# Patient Record
Sex: FEMALE | Race: WHITE | NOT HISPANIC OR LATINO | Employment: PART TIME | URBAN - METROPOLITAN AREA
[De-identification: names, ages, dates, MRNs, and addresses within clinical notes are randomized per-mention and may not be internally consistent; named-entity substitution may affect disease eponyms.]

---

## 2018-02-10 ENCOUNTER — HOSPITAL ENCOUNTER (EMERGENCY)
Facility: CLINIC | Age: 23
Discharge: HOME | End: 2018-02-10
Attending: EMERGENCY MEDICINE | Admitting: EMERGENCY MEDICINE

## 2018-02-10 ENCOUNTER — APPOINTMENT (OUTPATIENT)
Dept: GENERAL RADIOLOGY | Facility: CLINIC | Age: 23
End: 2018-02-10
Attending: FAMILY MEDICINE

## 2018-02-10 VITALS
DIASTOLIC BLOOD PRESSURE: 98 MMHG | SYSTOLIC BLOOD PRESSURE: 122 MMHG | TEMPERATURE: 97.6 F | BODY MASS INDEX: 18.6 KG/M2 | HEART RATE: 98 BPM | WEIGHT: 105 LBS | RESPIRATION RATE: 18 BRPM | OXYGEN SATURATION: 98 %

## 2018-02-10 DIAGNOSIS — R10.12 ABDOMINAL PAIN, LEFT UPPER QUADRANT: ICD-10-CM

## 2018-02-10 LAB — HCG UR QL: NEGATIVE

## 2018-02-10 PROCEDURE — 74018 RADEX ABDOMEN 1 VIEW: CPT | Mod: TC

## 2018-02-10 PROCEDURE — 81025 URINE PREGNANCY TEST: CPT | Performed by: NURSE PRACTITIONER

## 2018-02-10 PROCEDURE — 99284 EMERGENCY DEPT VISIT MOD MDM: CPT | Performed by: EMERGENCY MEDICINE

## 2018-02-10 PROCEDURE — 99284 EMERGENCY DEPT VISIT MOD MDM: CPT | Mod: Z6 | Performed by: EMERGENCY MEDICINE

## 2018-02-10 NOTE — ED NOTES
PT has lip metal piercings and lost the piercing about a wk ago and thought that it would pass, but recently just not feeling right.  She has had approx 3 BM's this wk.

## 2018-02-10 NOTE — ED AVS SNAPSHOT
[facility] Emergency Department    [address] [place] [address]    Phone:  [phone]    Fax:  [phone]                                       Jenny Bradford   MRN: [id number]    Department:  [facility] Emergency Department   Date of Visit:  2/10/2018           After Visit Summary Signature Page     I have received my discharge instructions, and my questions have been answered. I have discussed any challenges I see with this plan with the nurse or doctor.    ..........................................................................................................................................  Patient/Patient Representative Signature      ..........................................................................................................................................  Patient Representative Print Name and Relationship to Patient    ..................................................               ................................................  Date                                            Time    ..........................................................................................................................................  Reviewed by Signature/Title    ...................................................              ..............................................  Date                                                            Time

## 2018-02-10 NOTE — ED AVS SNAPSHOT
[facility] Emergency Department    [address] [place] [address]    Phone:  [phone]    Fax:  [phone]                                       Jenny Bradford   MRN: [id number]    Department:  [facility] Emergency Department   Date of Visit:  2/10/2018           Patient Information     Date Of Birth          1995        Your diagnoses for this visit were:     Abdominal pain, left upper quadrant left flank       You were seen by Leslye Castelan MD.      Follow-up Information     Follow up with Clinic, [facility].    Contact information:    [address] [facility]  [place] [address] [phone]          Discharge Instructions       Try to drink lots of fluids.    If you have any worsening or changes return at any time.    I hope you feel MUCH better quickly!!    24 Hour Appointment Hotline       To make an appointment at any [facility], call [phone] ([phone]). If you don't have a family doctor or clinic, we will help you find one. [place] clinics are conveniently located to serve the needs of you and your family.             Review of your medicines      Our records show that you are taking the medicines listed below. If these are incorrect, please call your family doctor or clinic.        Dose / Directions Last dose taken    albuterol 108 (90 BASE) MCG/ACT Inhaler   Commonly known as:  PROAIR HFA/PROVENTIL HFA/VENTOLIN HFA   Dose:  2 puff   Quantity:  1 Inhaler        Inhale 2 puffs into the lungs every 6 hours as needed for shortness of breath / dyspnea or wheezing   Refills:  0        diclofenac 1 % Gel topical gel   Commonly known as:  VOLTAREN   Quantity:  100 g        Apply 4 grams to chest 4 times daily for 10 days   Refills:  2        escitalopram 20 MG tablet   Commonly known as:  LEXAPRO   Quantity:  30 tablet        Take 1/2 tablet (10 mg) for 1-2 weeks, then increase to 1 tablet orally daily   Refills:  0        fluconazole  200 MG tablet   Commonly known as:  DIFLUCAN   Dose:  200 mg        Take 200 mg by mouth   Refills:  0        levonorgestrel 20 MCG/24HR IUD   Commonly known as:  MIRENA   Dose:  1 Device        1 Device by Intrauterine route   Refills:  0        ondansetron 4 MG tablet   Commonly known as:  ZOFRAN   Dose:  4 mg   Quantity:  12 tablet        Take 1 tablet (4 mg) by mouth every 8 hours as needed for nausea   Refills:  0                Procedures and tests performed during your visit     HCG qualitative urine (UPT)    XR Abdomen 1 View      Orders Needing Specimen Collection     None      Pending Results     No orders found from 2/8/2018 to 2/11/2018.            Pending Culture Results     No orders found from 2/8/2018 to 2/11/2018.            Pending Results Instructions     If you had any lab results that were not finalized at the time of your Discharge, you can call the ED Lab Result RN at [phone]. You will be contacted by this team for any positive Lab results or changes in treatment. The nurses are available 7 days a week from 10A to 6:30P.  You can leave a message 24 hours per day and they will return your call.        Thank you for choosing [facility]       Thank you for choosing [facility] for your care. Our goal is always to provide you with excellent care. Hearing back from our patients is one way we can continue to improve our services. Please take a few minutes to complete the written survey that you may receive in the mail after you visit with us. Thank you!        Prescription [employer] Information     [employer] gives you secure access to your electronic health record. If you see a primary care provider, you can also send messages to your care team and make appointments. If you have questions, please call your primary care clinic.  If you do not have a primary care provider, please call [phone] and they will assist you.        Care EveryWhere ID     This is your Care EveryWhere ID. This could be used by other  organizations to access your [facility] medical records  [id number]        Equal Access to Services     RIGOBERTO CORDOVA : Jean Marquez, beau hampton, clifton swan. So [facility] [phone].    ATENCIÓN: Si habla español, tiene a john disposición servicios gratuitos de asistencia lingüística. Llame al [phone].    We comply with applicable federal civil rights laws and [place] laws. We do not discriminate on the basis of race, color, national origin, age, disability, sex, sexual orientation, or gender identity.            After Visit Summary       This is your record. Keep this with you and show to your community pharmacist(s) and doctor(s) at your next visit.

## 2018-02-11 NOTE — ED PROVIDER NOTES
History     Chief Complaint   Patient presents with     Swallowed Foreign Body     The history is provided by the patient and medical records.     This is a 22-year-old female with history of asthma presenting with concerns after swallowing a foreign body.  Patient accidentally swallowed her lip ring that she was chewing on a few days ago.  She has had some intermittent stools over the last week which is not unusual for her.  She tends towards constipation and takes fiber.  Today she had the onset of left upper quadrant/left flank pain a couple hours prior to coming to the ED.  He was concerned that the cause may be from the swallowed foreign body silk she came to the ED.  The pain is a sharp ache and increases with movement or deep breath.  It makes her feel short of breath.  Patient has not had a bowel movement for the last couple of days.  She is still passing gas.  She denies any nausea at this point.  No vaginal discharge.  She states she is on injectable birth control.  No history of kidney stones.  She has had a cough but no fevers or chills.  The cough is basically nonproductive.  She denies any trauma.    Problem List:    Patient Active Problem List    Diagnosis Date Noted     Pelvic floor dysfunction- Sees Orville Damian. 01/05/2016     Priority: Medium     Tobacco use disorder 04/21/2015     Priority: Medium     Depo-Provera contraceptive status 11/17/2014     Priority: Medium     Contraception 11/17/2014     Priority: Medium     Anxiety 09/16/2014     Priority: Medium     Mild major depression (H) 09/16/2014     Priority: Medium     CARDIOVASCULAR SCREENING; LDL GOAL LESS THAN 160 09/04/2014     Priority: Medium        Past Medical History:    Past Medical History:   Diagnosis Date     Asthma        Past Surgical History:    History reviewed. No pertinent surgical history.    Family History:    No family history on file.    Social History:  Marital Status:  Single [1]  Social History    Substance Use Topics     Smoking status: Current Some Day Smoker     Types: Cigarettes     Smokeless tobacco: Never Used     Alcohol use Yes      Comment: rare        Medications:      fluconazole (DIFLUCAN) 200 MG tablet   levonorgestrel (MIRENA) 20 MCG/24HR IUD   ondansetron (ZOFRAN) 4 MG tablet   escitalopram (LEXAPRO) 20 MG tablet   albuterol (PROAIR HFA, PROVENTIL HFA, VENTOLIN HFA) 108 (90 BASE) MCG/ACT inhaler   diclofenac (VOLTAREN) 1 % GEL         Review of Systems   All other ROS reviewed and are negative or non-contributory except as stated in HPI.     Physical Exam   BP: (!) 122/98  Pulse: 98  Temp: 97.6  F (36.4  C)  Resp: 18  Weight: 47.6 kg (105 lb)  SpO2: 98 %      Physical Exam   Constitutional: She is oriented to person, place, and time. She appears well-developed and well-nourished.   Thin, very healthy-appearing female.  Moves easily about in the room.  Initially kissing her boyfriend when I walked in.   HENT:   Head: Normocephalic.   Nose: Nose normal.   Mouth/Throat: Oropharynx is clear and moist.   Eyes: Conjunctivae and EOM are normal. No scleral icterus.   Neck: Normal range of motion. Neck supple.   Cardiovascular: Normal rate, regular rhythm, normal heart sounds and intact distal pulses.    Pulmonary/Chest: Effort normal and breath sounds normal.   No crepitus, bony step-off, bony tenderness along the left lateral ribs.   Abdominal: Soft.   Left upper quadrant tenderness to palpation without mass or rebound.  Mild left CVA tenderness.  No external skin changes.  Very thin   Musculoskeletal: Normal range of motion. She exhibits no edema.   No midline spine tenderness   Neurological: She is alert and oriented to person, place, and time. No cranial nerve deficit. She exhibits normal muscle tone.   Skin: Skin is warm and dry. She is not diaphoretic.   Psychiatric: She has a normal mood and affect. Her behavior is normal.   Vitals reviewed.      ED Course (with Medical Decision Making)    Pt  seen and examined by me.  RN and EPIC notes reviewed.      Patient with acute onset of left upper quadrant/left flank pain.  She is mostly concerned that this may have been from swallowed foreign object.  Patient had a little bit of a triage weight so x-ray was done via triage.    Xray shows no evidence for foreign body.  UPT negative.    Results discussed with the patient.  We had a long discussion regarding her symptoms and possible causes including kidney stones, UTI, pneumonia, musculoskeletal, blood clot, other.  Chest x-ray is clear on my exam with regards to the left lower lung.  She did not have any blood in her urine.  She has a long history of what sounds like irritable bowel syndrome and constipation.  She would like to manage at home at this point.  If she has any worsening or changes she is going to return to the ED at any time.  Follow up with primary care provider if not improving.     ED Course     Procedures  Results for orders placed or performed during the hospital encounter of 02/10/18   XR Abdomen 1 View    Narrative    ABDOMEN ONE VIEW  2/10/2018 5:39 PM     HISTORY: Patient has lip metal piercings and lost the piercing about a  week ago and thought it would pass. Infrequent bowel movements this  week.    COMPARISON: None.      Impression    IMPRESSION: No metallic foreign body is seen in the abdomen or pelvis.  Normal bowel gas pattern. No free air.    JOSE MARY MD   HCG qualitative urine (UPT)   Result Value Ref Range    HCG Qual Urine Negative NEG^Negative      Assessments & Plan      I have reviewed the findings, diagnosis, plan and need for follow up with the patient.    Discharge Medication List as of 2/10/2018  6:07 PM          Final diagnoses:   Abdominal pain, left upper quadrant - left flank     Disposition: Patient discharged home in the care of her significant other in stable condition.  Plan as above.  Return for concerns.    Note: Chart documentation done in part with [employer]  Voice Recognition software. Although reviewed after completion, some word and grammatical errors may remain.       2/10/2018   [facility] EMERGENCY DEPARTMENT     Leslye Castelan MD  02/10/18 0018

## 2018-02-11 NOTE — DISCHARGE INSTRUCTIONS
Try to drink lots of fluids.    If you have any worsening or changes return at any time.    I hope you feel MUCH better quickly!!

## 2018-10-12 ENCOUNTER — HEALTH MAINTENANCE LETTER (OUTPATIENT)
Age: 23
End: 2018-10-12

## 2019-03-29 ENCOUNTER — ALLIED HEALTH/NURSE VISIT (OUTPATIENT)
Dept: FAMILY MEDICINE | Facility: OTHER | Age: 24
End: 2019-03-29

## 2019-03-29 VITALS — WEIGHT: 98 LBS | BODY MASS INDEX: 17.36 KG/M2

## 2019-03-29 DIAGNOSIS — Z32.00 POSSIBLE PREGNANCY, NOT YET CONFIRMED: Primary | ICD-10-CM

## 2019-03-29 LAB — HCG UR QL: POSITIVE

## 2019-03-29 PROCEDURE — 81025 URINE PREGNANCY TEST: CPT | Performed by: ADVANCED PRACTICE MIDWIFE

## 2019-03-29 PROCEDURE — 99207 ZZC NO CHARGE NURSE ONLY: CPT

## 2019-03-29 RX ORDER — POLYETHYLENE GLYCOL 3350 17 G/17G
17 POWDER, FOR SOLUTION ORAL
COMMUNITY
Start: 2013-01-30 | End: 2019-04-09

## 2019-03-29 RX ORDER — PRENATAL VIT/IRON FUM/FOLIC AC 27MG-0.8MG
1 TABLET ORAL DAILY
COMMUNITY

## 2019-03-29 NOTE — PROGRESS NOTES
Jenny Bradford is a 23 year old here today for a pregnancy test.  LMP: No LMP recorded (lmp unknown). Patient is pregnant.  Wt: 98 lbs 0 oz.    Symptoms include absence of menses and nausea, breast tenderness, mood swings    Jenny informed of positive pregnancy test results. TAMIKA: Unknown -    Educational advice given: nutrition, smoking and drugs & alcohol.    Current medications reviewed: Yes    Previous pregnancy history remarkable for: no previous pregnancies.    Plan: follow-up appointment with Dr. Riddle for pre- care, take multivitamin or pre- vitamins and OB Education packet given.    She is to call back if she has any questions or concerns.  She is advised to notify a provider immediately if she experiences any severe cramping or abdominal pain or any vaginal bleeding.    Next 5 appointments (look out 90 days)    2019  3:00 PM CDT  New Prenatal with [facility] INTAKE  [facility] [address] [address] [phone]        Flaquita Beckham, RN, BSN

## 2019-04-09 ENCOUNTER — PRENATAL OFFICE VISIT (OUTPATIENT)
Dept: FAMILY MEDICINE | Facility: OTHER | Age: 24
End: 2019-04-09
Payer: MEDICAID

## 2019-04-09 VITALS — WEIGHT: 98 LBS | HEIGHT: 63 IN | BODY MASS INDEX: 17.36 KG/M2

## 2019-04-09 DIAGNOSIS — Z34.00 SUPERVISION OF NORMAL FIRST PREGNANCY: Primary | ICD-10-CM

## 2019-04-09 DIAGNOSIS — J45.909 UNCOMPLICATED ASTHMA, UNSPECIFIED ASTHMA SEVERITY, UNSPECIFIED WHETHER PERSISTENT: Primary | ICD-10-CM

## 2019-04-09 LAB
ABO + RH BLD: NORMAL
ABO + RH BLD: NORMAL
B-HCG SERPL-ACNC: ABNORMAL IU/L (ref 0–5)
BLD GP AB SCN SERPL QL: NORMAL
BLOOD BANK CMNT PATIENT-IMP: NORMAL
ERYTHROCYTE [DISTWIDTH] IN BLOOD BY AUTOMATED COUNT: 12.7 % (ref 10–15)
HCT VFR BLD AUTO: 36.8 % (ref 35–47)
HGB BLD-MCNC: 12.5 G/DL (ref 11.7–15.7)
MCH RBC QN AUTO: 29.8 PG (ref 26.5–33)
MCHC RBC AUTO-ENTMCNC: 34 G/DL (ref 31.5–36.5)
MCV RBC AUTO: 88 FL (ref 78–100)
PLATELET # BLD AUTO: 187 10E9/L (ref 150–450)
RBC # BLD AUTO: 4.19 10E12/L (ref 3.8–5.2)
SPECIMEN EXP DATE BLD: NORMAL
WBC # BLD AUTO: 6.2 10E9/L (ref 4–11)

## 2019-04-09 PROCEDURE — 87389 HIV-1 AG W/HIV-1&-2 AB AG IA: CPT | Performed by: OBSTETRICS & GYNECOLOGY

## 2019-04-09 PROCEDURE — 86850 RBC ANTIBODY SCREEN: CPT | Performed by: OBSTETRICS & GYNECOLOGY

## 2019-04-09 PROCEDURE — 0064U ANTB TP TOTAL&RPR IA QUAL: CPT | Performed by: OBSTETRICS & GYNECOLOGY

## 2019-04-09 PROCEDURE — 85027 COMPLETE CBC AUTOMATED: CPT | Performed by: OBSTETRICS & GYNECOLOGY

## 2019-04-09 PROCEDURE — 87340 HEPATITIS B SURFACE AG IA: CPT | Performed by: OBSTETRICS & GYNECOLOGY

## 2019-04-09 PROCEDURE — 86901 BLOOD TYPING SEROLOGIC RH(D): CPT | Performed by: OBSTETRICS & GYNECOLOGY

## 2019-04-09 PROCEDURE — 36415 COLL VENOUS BLD VENIPUNCTURE: CPT | Performed by: OBSTETRICS & GYNECOLOGY

## 2019-04-09 PROCEDURE — 86762 RUBELLA ANTIBODY: CPT | Performed by: OBSTETRICS & GYNECOLOGY

## 2019-04-09 PROCEDURE — 84702 CHORIONIC GONADOTROPIN TEST: CPT | Performed by: OBSTETRICS & GYNECOLOGY

## 2019-04-09 PROCEDURE — 87086 URINE CULTURE/COLONY COUNT: CPT | Performed by: OBSTETRICS & GYNECOLOGY

## 2019-04-09 PROCEDURE — 99207 ZZC NO CHARGE NURSE ONLY: CPT

## 2019-04-09 PROCEDURE — 86900 BLOOD TYPING SEROLOGIC ABO: CPT | Performed by: OBSTETRICS & GYNECOLOGY

## 2019-04-09 ASSESSMENT — MIFFLIN-ST. JEOR: SCORE: 1158.66

## 2019-04-10 LAB
BACTERIA SPEC CULT: NORMAL
HBV SURFACE AG SERPL QL IA: NONREACTIVE
HIV 1+2 AB+HIV1 P24 AG SERPL QL IA: NONREACTIVE
Lab: NORMAL
RUBV IGG SERPL IA-ACNC: 33 IU/ML
SPECIMEN SOURCE: NORMAL
T PALLIDUM AB SER QL: NONREACTIVE

## 2019-04-19 ENCOUNTER — ANCILLARY PROCEDURE (OUTPATIENT)
Dept: ULTRASOUND IMAGING | Facility: OTHER | Age: 24
End: 2019-04-19
Attending: OBSTETRICS & GYNECOLOGY
Payer: MEDICAID

## 2019-04-19 DIAGNOSIS — Z34.00 SUPERVISION OF NORMAL FIRST PREGNANCY: ICD-10-CM

## 2019-04-19 PROCEDURE — 76801 OB US < 14 WKS SINGLE FETUS: CPT

## 2019-05-17 ENCOUNTER — TELEPHONE (OUTPATIENT)
Dept: FAMILY MEDICINE | Facility: OTHER | Age: 24
End: 2019-05-17

## 2019-05-17 NOTE — TELEPHONE ENCOUNTER
Unable to reach patient via phone. Left message to call clinic back.     When pt calls back please reschedule her appt on Monday, 5/20.  She is being seen for a new prenatal appt which needs to be 30 minutes not 15.    Yocasta Bass RN

## 2019-05-17 NOTE — TELEPHONE ENCOUNTER
Pt scheduled a new prenatal visit via [employer] in a 15 minute time slot on Monday.  This needs to be a 30 minute time slot.  Sent pt a [employer] message requesting she reschedule for a 30 minute time slot either via [employer] or by calling the clinic.    Yocasta Bass RN

## 2019-05-31 ENCOUNTER — NURSE TRIAGE (OUTPATIENT)
Dept: NURSING | Facility: CLINIC | Age: 24
End: 2019-05-31

## 2019-06-11 ENCOUNTER — PRENATAL OFFICE VISIT (OUTPATIENT)
Dept: OBGYN | Facility: CLINIC | Age: 24
End: 2019-06-11
Payer: MEDICAID

## 2019-06-11 VITALS
BODY MASS INDEX: 19.61 KG/M2 | DIASTOLIC BLOOD PRESSURE: 58 MMHG | SYSTOLIC BLOOD PRESSURE: 104 MMHG | WEIGHT: 110.7 LBS | HEART RATE: 80 BPM

## 2019-06-11 DIAGNOSIS — Z3A.19 PREGNANCY WITH 19 COMPLETED WEEKS GESTATION: Primary | ICD-10-CM

## 2019-06-11 PROCEDURE — 87591 N.GONORRHOEAE DNA AMP PROB: CPT | Performed by: OBSTETRICS & GYNECOLOGY

## 2019-06-11 PROCEDURE — 87491 CHLMYD TRACH DNA AMP PROBE: CPT | Performed by: OBSTETRICS & GYNECOLOGY

## 2019-06-11 PROCEDURE — 99207 ZZC FIRST OB VISIT: CPT | Performed by: OBSTETRICS & GYNECOLOGY

## 2019-06-11 PROCEDURE — G0145 SCR C/V CYTO,THINLAYER,RESCR: HCPCS | Performed by: OBSTETRICS & GYNECOLOGY

## 2019-06-11 RX ORDER — ACETAMINOPHEN 325 MG/1
325-650 TABLET ORAL EVERY 6 HOURS PRN
Status: ON HOLD | COMMUNITY
End: 2019-09-11

## 2019-06-11 RX ORDER — PRENATAL VIT/IRON FUM/FOLIC AC 27MG-0.8MG
1 TABLET ORAL DAILY
Qty: 90 TABLET | Refills: 3 | Status: SHIPPED | OUTPATIENT
Start: 2019-06-11 | End: 2019-06-11 | Stop reason: ALTCHOICE

## 2019-06-11 RX ORDER — PNV NO.95/FERROUS FUM/FOLIC AC 28MG-0.8MG
1 TABLET ORAL EVERY 24 HOURS
Qty: 90 TABLET | Refills: 3 | Status: ON HOLD | OUTPATIENT
Start: 2019-06-11 | End: 2019-10-31

## 2019-06-11 RX ORDER — FERROUS GLUCONATE 324(38)MG
324 TABLET ORAL
Qty: 60 TABLET | Refills: 3 | Status: ON HOLD | OUTPATIENT
Start: 2019-06-11 | End: 2019-10-31

## 2019-06-11 NOTE — PROGRESS NOTES
New OB    HPI:  Jenny Bradford is a 23 year old  at 19w0d by 11w3d Ultrasound who presents for new OB. Unplanned surprise pregnancy (didn't know until two months along) though happy. Had a brief period of nausea early on, now feeling great. She works as a , social drinker though no binge drinking. Possible exposure prior to finding out she was pregnant. No further EtOH consumption, also has quit smoking.   Hx notable for dyspareunia, tight painful pelvic floor muscles, states she has always had this. No hx of sexual trauma. Has had dyspareunia with most of her sexual partners, does tend to be position dependent. She has also undergone pelvic floor PT, states it was helpful, and that they told her she can continue the work on her own.   Otherwise healthy young woman, only notable hx is asthma, no surgeries, no Gyn hx.     PMH:   Past Medical History:   Diagnosis Date     Asthma      Ovarian cyst      SurgHx:   Past Surgical History:   Procedure Laterality Date     NO HISTORY OF SURGERY       FamHx:   Family History   Problem Relation Age of Onset     Thyroid Disease Mother      Asthma Mother      Ovarian cysts Mother      No Known Problems Father      No Known Problems Maternal Grandmother      Hypertension Maternal Grandfather      No Known Problems Paternal Grandmother      No Known Problems Paternal Grandfather      Asthma Half-Sister      Asthma Half-Sister      Asthma Half-Brother      Asthma Half-Brother      Asthma Half-Brother      SocHx:   Social History     Socioeconomic History     Marital status: Single     Spouse name: None     Number of children: None     Years of education: None     Highest education level: None   Occupational History     None   Social Needs     Financial resource strain: None     Food insecurity:     Worry: None     Inability: None     Transportation needs:     Medical: None     Non-medical: None   Tobacco Use     Smoking status: Former Smoker     Packs/day: 0.10  "    Types: Cigarettes     Smokeless tobacco: Never Used   Substance and Sexual Activity     Alcohol use: Not Currently     Comment: rare     Drug use: Yes     Types: Marijuana     Comment: occasionally marijuana  \"once or twice a day\"     Sexual activity: Yes     Partners: Male   Lifestyle     Physical activity:     Days per week: None     Minutes per session: None     Stress: None   Relationships     Social connections:     Talks on phone: None     Gets together: None     Attends [employer] service: None     Active member of club or organization: None     Attends meetings of clubs or organizations: None     Relationship status: None     Intimate partner violence:     Fear of current or ex partner: None     Emotionally abused: None     Physically abused: None     Forced sexual activity: None   Other Topics Concern     Parent/sibling w/ CABG, MI or angioplasty before 65F 55M? No   Social History Narrative    4/2019  Lives in [place] with Zacarias [place] with Zacarias's family.  Jenny is quitting smoking and Zacarias smokes e-cig.  Others in the home smoke outside.  They have indoor cats.  Advised about toxoplasmosis precautions.  No concerns about domestic violence.  She is a /.     Allergies:   Amitriptyline-perphenazine [perphenazine-amitriptyline]    Current Medications:   Current Outpatient Medications   Medication Sig Dispense Refill     acetaminophen (TYLENOL) 325 MG tablet Take 325-650 mg by mouth every 6 hours as needed for mild pain       Prenatal Vit-Fe Fumarate-FA (PRENATAL MULTIVITAMIN W/IRON) 27-0.8 MG tablet Take 1 tablet by mouth daily       ranitidine (ZANTAC) 75 MG tablet Take 150 mg by mouth 2 times daily       beclomethasone (QVAR) 80 MCG/ACT AERS IS A DISCONTINUED MEDICATION INHALE 1 PFUF BY MOUTH TWICE DAILY       beclomethasone HFA (QVAR REDIHALER) 80 MCG/ACT inhaler Inhale 1 puff into the lungs 2 times daily (Patient not taking: Reported on 6/11/2019) 1 Inhaler 3     ROS: 10 point " ROS negative other than as noted above    EXAM:  Vitals:    19 1258   BP: 104/58   BP Location: Right arm   Cuff Size: Adult Regular   Pulse: 80   Weight: 50.2 kg (110 lb 11.2 oz)    Body mass index is 19.61 kg/m .    Gen: Alert, oriented, appropriately interactive, NAD  CV: RRR, no murmurs, no extra heart sounds, 2+ peripheral pulses  Resp: CTAB, good effort without distress   Breasts: no axillary adenopathy, no dominant masses, no skin changes, no nipple discharge, nontender  Abdomen: soft, gravid, non tender, non distended, no masses, no hernias. No inguinal lymphadenopathy.   Perineum: no lesions; normal appearing external genitalia, bartholins glands, urethra, skenes glands  Vagina : no masses or lesions or discharge, normally rugated.  Cervix: no masses or lesions or discharge   Bimanual exam:   Pelvic floor muscles tight and tender circumferentially   Uterus- midline, gravid, non-tender  Adnexa - no masses or tenderness appreciated   No cervical motion tenderness  Lower extremities: non-tender, no edema  Skin: no lesions or rashes    Labs & Imaging:  A+, Ab-, RI, HIV-, HBSA-, Trep-  UCx wnl  Hgb 10.0  GC/Chlam pending  PAP pending    ASSESSMENT/PLAN:  Jenny Bradford is a 23 year old  at 19w0d by 11w3d Ultrasound who presents for new OB.     Pregnancy complicated by:  Late onset of cares  Possible EtOH exposure 1st tri  Pelvic floor dysfunction     1. Pregnancy with 19 completed weeks gestation  - New OB labs wnl as noted above, PAP & GC/Chlam collected today, anatomy scan ordered  - Precautions reviewed, no concern for current exposures   - Prenatal vitamin   - Pap imaged thin layer screen reflex to HPV if ASCUS - recommend age 25 - 29  - Chlamydia trachomatis PCR  - Neisseria gonorrhoeae PCR  - US OB > 14 Weeks; Future    2. Anemia  - Ferrous gluconate    3. Pelvic floor dysfunction   - Encouraged to continue techniques from prior pelvic floor PT  - Discussed perineal massage     4. Genetic  screening  - Presenting too late for screening    Jose Riddle MD  6/11/2019 4:45 PM

## 2019-06-12 LAB
C TRACH DNA SPEC QL NAA+PROBE: NEGATIVE
N GONORRHOEA DNA SPEC QL NAA+PROBE: NEGATIVE
SPECIMEN SOURCE: NORMAL
SPECIMEN SOURCE: NORMAL

## 2019-06-13 LAB
COPATH REPORT: NORMAL
PAP: NORMAL

## 2019-06-25 ENCOUNTER — HOSPITAL ENCOUNTER (OUTPATIENT)
Dept: ULTRASOUND IMAGING | Facility: CLINIC | Age: 24
Discharge: HOME | End: 2019-06-25
Attending: OBSTETRICS & GYNECOLOGY | Admitting: OBSTETRICS & GYNECOLOGY
Payer: MEDICAID

## 2019-06-25 DIAGNOSIS — Z3A.19 PREGNANCY WITH 19 COMPLETED WEEKS GESTATION: ICD-10-CM

## 2019-06-25 PROCEDURE — 76805 OB US >/= 14 WKS SNGL FETUS: CPT

## 2019-08-05 ENCOUNTER — PRENATAL OFFICE VISIT (OUTPATIENT)
Dept: OBGYN | Facility: OTHER | Age: 24
End: 2019-08-05
Payer: MEDICAID

## 2019-08-05 VITALS
HEART RATE: 80 BPM | WEIGHT: 115.4 LBS | DIASTOLIC BLOOD PRESSURE: 58 MMHG | SYSTOLIC BLOOD PRESSURE: 104 MMHG | BODY MASS INDEX: 20.44 KG/M2

## 2019-08-05 DIAGNOSIS — F32.A DEPRESSION DURING PREGNANCY IN SECOND TRIMESTER: ICD-10-CM

## 2019-08-05 DIAGNOSIS — O99.342 DEPRESSION DURING PREGNANCY IN SECOND TRIMESTER: ICD-10-CM

## 2019-08-05 DIAGNOSIS — Z3A.26 26 WEEKS GESTATION OF PREGNANCY: Primary | ICD-10-CM

## 2019-08-05 LAB
ERYTHROCYTE [DISTWIDTH] IN BLOOD BY AUTOMATED COUNT: 13.2 % (ref 10–15)
GLUCOSE 1H P 50 G GLC PO SERPL-MCNC: 83 MG/DL (ref 60–129)
HCT VFR BLD AUTO: 32.3 % (ref 35–47)
HGB BLD-MCNC: 10.8 G/DL (ref 11.7–15.7)
MCH RBC QN AUTO: 30.6 PG (ref 26.5–33)
MCHC RBC AUTO-ENTMCNC: 33.4 G/DL (ref 31.5–36.5)
MCV RBC AUTO: 92 FL (ref 78–100)
PLATELET # BLD AUTO: 186 10E9/L (ref 150–450)
RBC # BLD AUTO: 3.53 10E12/L (ref 3.8–5.2)
WBC # BLD AUTO: 9 10E9/L (ref 4–11)

## 2019-08-05 PROCEDURE — 36415 COLL VENOUS BLD VENIPUNCTURE: CPT | Performed by: OBSTETRICS & GYNECOLOGY

## 2019-08-05 PROCEDURE — 86780 TREPONEMA PALLIDUM: CPT | Performed by: OBSTETRICS & GYNECOLOGY

## 2019-08-05 PROCEDURE — 82950 GLUCOSE TEST: CPT | Performed by: OBSTETRICS & GYNECOLOGY

## 2019-08-05 PROCEDURE — 85027 COMPLETE CBC AUTOMATED: CPT | Performed by: OBSTETRICS & GYNECOLOGY

## 2019-08-05 PROCEDURE — 99207 ZZC PRENATAL VISIT: CPT | Performed by: OBSTETRICS & GYNECOLOGY

## 2019-08-05 RX ORDER — POLYETHYLENE GLYCOL 3350 17 G/17G
1 POWDER, FOR SOLUTION ORAL DAILY
Qty: 1 BOTTLE | Refills: 3 | Status: ON HOLD | OUTPATIENT
Start: 2019-08-05 | End: 2019-10-31

## 2019-08-06 LAB — T PALLIDUM AB SER QL: NONREACTIVE

## 2019-08-06 NOTE — PROGRESS NOTES
DEBRA    Doing overall well. She does have some concerns. Constipation and GERD. Also mood, feeling depressed, she believes she may have bipolar disorder, prior psychiatric hx, reports has been on many mood stabilizers, though failed several SSRIs given mental disturbances. She is worried about postpartum and wondering if she should been seen by psychiatry. She is also concerns about lack of support at home when see will have the new baby, partner works long hours, its mostly just her and her young step son. No suicidal thoughts. Otherwise, +FM. Denies contractions, VB, LOF, abnormal discharge, dysuria.     EXAM:  Vitals:    19 1405   BP: 104/58   BP Location: Right arm   Cuff Size: Adult Regular   Pulse: 80   Weight: 52.3 kg (115 lb 6.4 oz)       Gen: Alert, oriented, appropriately interactive, NAD  Resp: good effort without distress   Abdomen: soft, gravid, non tender, non distended, no masses    FHR: 24  FH: 150    Labs & Imaging:  A+, Ab-, RI, HIV-, HBSA-, Trep-  UCx wnl  Hgb 10.0 -> 10.8  GC/Chlam neg  PAP NIL  GCT 83    Ultrasound (19): 20w5d, Br, PP, DULCE 11.8, 39%tile, normal anatomy    ASSESSMENT/PLAN: Jenny Bradford is a 23 year old  at 26w6d by 11w3d Ultrasound who presents for DEBRA.    Pregnancy complicated by:  Late onset of cares  Bipolar disorder, not on meds  Possible EtOH exposure 1st tri  Pelvic floor dysfunction     1. 26 weeks gestation of pregnancy  New OB labs, Anatomy Ultrasound, New OB exam all WNL  - Glucose tolerance, gest screen, 1 hour  - CBC with platelets  - Treponema Abs w Reflex to RPR and Titer  - polyethylene glycol (MIRALAX/GLYCOLAX) powder; Take 17 g (1 capful) by mouth daily  Dispense: 1 Bottle; Refill: 3    2. Depression during pregnancy in second trimester  - Recommending establish care, eval, treat, will return to [facility]   - MENTAL HEALTH REFERRAL  - Adult; Psychiatry and Medication Management; Psychiatry; Other: Not Listed - Enter Referral Details in  Scheduling Comments Below; Patient call to schedule    3. Pelvic floor dysfunction  - Discussed perineal massage     Jose Riddle   8/5/2019 9:47 PM

## 2019-09-11 ENCOUNTER — TELEPHONE (OUTPATIENT)
Dept: FAMILY MEDICINE | Facility: OTHER | Age: 24
End: 2019-09-11

## 2019-09-11 ENCOUNTER — HOSPITAL ENCOUNTER (OUTPATIENT)
Facility: CLINIC | Age: 24
Discharge: HOME | End: 2019-09-11
Attending: FAMILY MEDICINE | Admitting: FAMILY MEDICINE
Payer: MEDICAID

## 2019-09-11 VITALS
TEMPERATURE: 98.2 F | RESPIRATION RATE: 16 BRPM | DIASTOLIC BLOOD PRESSURE: 65 MMHG | HEART RATE: 74 BPM | SYSTOLIC BLOOD PRESSURE: 103 MMHG

## 2019-09-11 DIAGNOSIS — R11.0 NAUSEA: Primary | ICD-10-CM

## 2019-09-11 PROBLEM — Z36.89 ENCOUNTER FOR TRIAGE IN PREGNANT PATIENT: Status: ACTIVE | Noted: 2019-09-11

## 2019-09-11 LAB
ALBUMIN SERPL-MCNC: 3.1 G/DL (ref 3.4–5)
ALBUMIN UR-MCNC: NEGATIVE MG/DL
ALP SERPL-CCNC: 89 U/L (ref 40–150)
ALT SERPL W P-5'-P-CCNC: 24 U/L (ref 0–50)
ANION GAP SERPL CALCULATED.3IONS-SCNC: 10 MMOL/L (ref 3–14)
APPEARANCE UR: CLEAR
AST SERPL W P-5'-P-CCNC: 17 U/L (ref 0–45)
BILIRUB SERPL-MCNC: 0.4 MG/DL (ref 0.2–1.3)
BILIRUB UR QL STRIP: NEGATIVE
BUN SERPL-MCNC: 2 MG/DL (ref 7–30)
CALCIUM SERPL-MCNC: 8.6 MG/DL (ref 8.5–10.1)
CHLORIDE SERPL-SCNC: 106 MMOL/L (ref 94–109)
CO2 SERPL-SCNC: 23 MMOL/L (ref 20–32)
COLOR UR AUTO: YELLOW
CREAT SERPL-MCNC: 0.48 MG/DL (ref 0.52–1.04)
CREAT UR-MCNC: 37 MG/DL
ERYTHROCYTE [DISTWIDTH] IN BLOOD BY AUTOMATED COUNT: 12.9 % (ref 10–15)
GFR SERPL CREATININE-BSD FRML MDRD: >90 ML/MIN/{1.73_M2}
GLUCOSE SERPL-MCNC: 78 MG/DL (ref 70–99)
GLUCOSE UR STRIP-MCNC: NEGATIVE MG/DL
HCT VFR BLD AUTO: 34.1 % (ref 35–47)
HGB BLD-MCNC: 11.5 G/DL (ref 11.7–15.7)
HGB UR QL STRIP: NEGATIVE
KETONES UR STRIP-MCNC: 20 MG/DL
LEUKOCYTE ESTERASE UR QL STRIP: NEGATIVE
MCH RBC QN AUTO: 30.5 PG (ref 26.5–33)
MCHC RBC AUTO-ENTMCNC: 33.7 G/DL (ref 31.5–36.5)
MCV RBC AUTO: 91 FL (ref 78–100)
NITRATE UR QL: NEGATIVE
PH UR STRIP: 8 PH (ref 5–7)
PLATELET # BLD AUTO: 180 10E9/L (ref 150–450)
POTASSIUM SERPL-SCNC: 3.8 MMOL/L (ref 3.4–5.3)
PROT SERPL-MCNC: 6.9 G/DL (ref 6.8–8.8)
PROT UR-MCNC: 0.06 G/L
PROT/CREAT 24H UR: 0.16 G/G CR (ref 0–0.2)
RBC # BLD AUTO: 3.77 10E12/L (ref 3.8–5.2)
RBC #/AREA URNS AUTO: 0 /HPF (ref 0–2)
SODIUM SERPL-SCNC: 139 MMOL/L (ref 133–144)
SOURCE: ABNORMAL
SP GR UR STRIP: 1 (ref 1–1.03)
SQUAMOUS #/AREA URNS AUTO: 1 /HPF (ref 0–1)
UROBILINOGEN UR STRIP-MCNC: 0 MG/DL (ref 0–2)
WBC # BLD AUTO: 11.9 10E9/L (ref 4–11)
WBC #/AREA URNS AUTO: 0 /HPF (ref 0–5)

## 2019-09-11 PROCEDURE — 25800030 ZZH RX IP 258 OP 636: Performed by: FAMILY MEDICINE

## 2019-09-11 PROCEDURE — 96361 HYDRATE IV INFUSION ADD-ON: CPT

## 2019-09-11 PROCEDURE — 84156 ASSAY OF PROTEIN URINE: CPT | Performed by: FAMILY MEDICINE

## 2019-09-11 PROCEDURE — 36415 COLL VENOUS BLD VENIPUNCTURE: CPT | Performed by: FAMILY MEDICINE

## 2019-09-11 PROCEDURE — 25000128 H RX IP 250 OP 636: Performed by: FAMILY MEDICINE

## 2019-09-11 PROCEDURE — 85027 COMPLETE CBC AUTOMATED: CPT | Performed by: FAMILY MEDICINE

## 2019-09-11 PROCEDURE — 96360 HYDRATION IV INFUSION INIT: CPT

## 2019-09-11 PROCEDURE — 25000125 ZZHC RX 250

## 2019-09-11 PROCEDURE — 96374 THER/PROPH/DIAG INJ IV PUSH: CPT

## 2019-09-11 PROCEDURE — 80053 COMPREHEN METABOLIC PANEL: CPT | Performed by: FAMILY MEDICINE

## 2019-09-11 PROCEDURE — G0463 HOSPITAL OUTPT CLINIC VISIT: HCPCS | Mod: 25

## 2019-09-11 PROCEDURE — 81001 URINALYSIS AUTO W/SCOPE: CPT | Performed by: FAMILY MEDICINE

## 2019-09-11 RX ORDER — LIDOCAINE HYDROCHLORIDE 10 MG/ML
INJECTION, SOLUTION EPIDURAL; INFILTRATION; INTRACAUDAL; PERINEURAL
Status: COMPLETED
Start: 2019-09-11 | End: 2019-09-11

## 2019-09-11 RX ORDER — ONDANSETRON 4 MG/1
4 TABLET, ORALLY DISINTEGRATING ORAL EVERY 8 HOURS PRN
Qty: 30 TABLET | Refills: 0 | Status: ON HOLD | OUTPATIENT
Start: 2019-09-11 | End: 2019-10-31

## 2019-09-11 RX ORDER — SODIUM CHLORIDE, SODIUM LACTATE, POTASSIUM CHLORIDE, CALCIUM CHLORIDE 600; 310; 30; 20 MG/100ML; MG/100ML; MG/100ML; MG/100ML
INJECTION, SOLUTION INTRAVENOUS CONTINUOUS
Status: DISCONTINUED | OUTPATIENT
Start: 2019-09-11 | End: 2019-09-11 | Stop reason: HOSPADM

## 2019-09-11 RX ORDER — ONDANSETRON 2 MG/ML
4 INJECTION INTRAMUSCULAR; INTRAVENOUS EVERY 6 HOURS PRN
Status: DISCONTINUED | OUTPATIENT
Start: 2019-09-11 | End: 2019-09-11 | Stop reason: HOSPADM

## 2019-09-11 RX ORDER — LIDOCAINE HYDROCHLORIDE 10 MG/ML
1 INJECTION, SOLUTION EPIDURAL; INFILTRATION; INTRACAUDAL; PERINEURAL ONCE
Status: COMPLETED | OUTPATIENT
Start: 2019-09-11 | End: 2019-09-11

## 2019-09-11 RX ADMIN — SODIUM CHLORIDE, POTASSIUM CHLORIDE, SODIUM LACTATE AND CALCIUM CHLORIDE 1000 ML: 600; 310; 30; 20 INJECTION, SOLUTION INTRAVENOUS at 13:54

## 2019-09-11 RX ADMIN — LIDOCAINE HYDROCHLORIDE 1 ML: 10 INJECTION, SOLUTION EPIDURAL; INFILTRATION; INTRACAUDAL; PERINEURAL at 13:53

## 2019-09-11 RX ADMIN — ONDANSETRON 4 MG: 2 INJECTION INTRAMUSCULAR; INTRAVENOUS at 13:56

## 2019-09-11 NOTE — PROGRESS NOTES
Pt feeling much better after eating & IV fluid bolus completed. Dr. Woo's medical assistant was notified to have Dr. Woo call [facility] for an update on pt's status.  Report was given to ЕЛЕНА Hammond RN to take over care at this time.  Oncoming RN has no questions.

## 2019-09-11 NOTE — PROGRESS NOTES
S: Triage Arrival  B: Jenny is a 23 y.o.  @ 32w 1d who presents with complaint of shortness of breath, fatigue, right side upper abdominal pain  A: EFM applied. FHT's 125 with moderate variability, accels present, no decels noted on strip. Pt denies bleeding & denies leaking of fluid. Contractions every couple of minutes noted on monitor but not felt per pt.  Dr. Woo is at bedside assessing pt.    R: Will await MD's plan for care.

## 2019-09-11 NOTE — PROGRESS NOTES
S:  Discharge from triage.   A:  FHT's 135, Moderate variability, Accels present, no decels noted. Contractions occ with irrit. Nausea gone. Pt feeling much better.  R:  Pt sent home with prn Zofran. Written and verbal D/C instruction provided. Pt. Verbalized understanding of D/C instructions and will follow up with [facility] as previously scheduled.   Verbalizes understanding that she will call or return to [facility] with any further questions or concerns.   Pt. D/C'd via walking with sister.    Nursing Discharge Checklist  Discharge order entered: Yes  Patient care order entered: Yes  Charges entered: Yes  IV start and stop times have been documented in Epic? Yes  NST start and stop times have been documented in Epic Doc F/S? NA

## 2019-09-11 NOTE — PROGRESS NOTES
MD gave orders for labs, IV fluids & IV Zofran.  MD reviewed UA results.  Will update MD when lab results are back & IV fluid bolus completed.

## 2019-09-11 NOTE — TELEPHONE ENCOUNTER
"Pt is 32w1d.  Her last prenatal appt was on 8/5/19.  She states for the last couple of days she feels that baby is scraping against her belly button and makes her feel like she has to urinate and vomit.  She has also been experiencing \"fainting spells\" or what feels like she is going to faint.  She states her eyesight gets \"hazy\" and she has a tough time breathing.  She has to lay down and then it eventually goes away.  She states she feels like all she can do lately is lay down.    Pt also explains that she has a \"cold\" sensation in her right upper quadrant up near her rib cage.  Pt thinks she is drinking plenty of fluids.  I advised pt to be further evaluated in L&D at [facility] now due to her hazy eyesight, dyspnea and odd right upper quadrant sensation.  I also advised that she needs to f/u with Dr. Riddle for a prenatal visit since it has been over a month.  Pt verbalized understanding and agreed to plan.    Yocasta Bass RN    "

## 2019-09-13 ENCOUNTER — PRENATAL OFFICE VISIT (OUTPATIENT)
Dept: OBGYN | Facility: OTHER | Age: 24
End: 2019-09-13
Payer: MEDICAID

## 2019-09-13 VITALS
WEIGHT: 120.2 LBS | HEART RATE: 84 BPM | DIASTOLIC BLOOD PRESSURE: 62 MMHG | BODY MASS INDEX: 21.29 KG/M2 | SYSTOLIC BLOOD PRESSURE: 118 MMHG

## 2019-09-13 DIAGNOSIS — Z3A.32 32 WEEKS GESTATION OF PREGNANCY: Primary | ICD-10-CM

## 2019-09-13 DIAGNOSIS — O99.343 MENTAL DISORDER DURING PREGNANCY IN THIRD TRIMESTER: ICD-10-CM

## 2019-09-13 PROCEDURE — 90715 TDAP VACCINE 7 YRS/> IM: CPT | Performed by: OBSTETRICS & GYNECOLOGY

## 2019-09-13 PROCEDURE — 99207 ZZC PRENATAL VISIT: CPT | Performed by: OBSTETRICS & GYNECOLOGY

## 2019-09-13 PROCEDURE — 90471 IMMUNIZATION ADMIN: CPT | Performed by: OBSTETRICS & GYNECOLOGY

## 2019-09-13 NOTE — PROGRESS NOTES
Prior to immunization administration, verified patients identity using patient s name and date of birth. Please see Immunization Activity for additional information.     Screening Questionnaire for Adult Immunization    Are you sick today?   No   Do you have allergies to medications, food, a vaccine component or latex?   No   Have you ever had a serious reaction after receiving a vaccination?   No   Do you have a long-term health problem with heart disease, lung disease, asthma, kidney disease, metabolic disease (e.g. diabetes), anemia, or other blood disorder?   No   Do you have cancer, leukemia, HIV/AIDS, or any other immune system problem?   No   In the past 3 months, have you taken medications that affect  your immune system, such as prednisone, other steroids, or anticancer drugs; drugs for the treatment of rheumatoid arthritis, Crohn s disease, or psoriasis; or have you had radiation treatments?   No   Have you had a seizure, or a brain or other nervous system problem?   No   During the past year, have you received a transfusion of blood or blood     products, or been given immune (gamma) globulin or antiviral drug?   No   For women: Are you pregnant or is there a chance you could become        pregnant during the next month?   Yes   Have you received any vaccinations in the past 4 weeks?   No     Immunization questionnaire was positive for at least one answer.  Notified Dr. Riddle.        Per orders of Dr. Riddle, injection of Tdap given by Yocasta Bass RN. Patient instructed to remain in clinic for 15 minutes afterwards, and to report any adverse reaction to me immediately.       Screening performed by Yocasta Bass RN on 9/13/2019 at 10:43 AM.    Yocasta Bass RN

## 2019-09-13 NOTE — PROGRESS NOTES
DEBRA    Doing well. States she can feel some pelvic discomfort with dehydrated, also recent triage visit for same, felt better once received fluids. Otherwise, pregnancy going well.  Regarding her mood hx, and possible bipolar disorder, recently started seeing a psychologist, receiving counseling and states it is helping. Also will see a psychiatrist this month.   Otherwise, +FM, denies contractions, VB, LOF, abnormal discharge, dysuria.     EXAM:  Vitals:    19 1007   BP: 118/62   BP Location: Right arm   Cuff Size: Adult Regular   Pulse: 84   Weight: 54.5 kg (120 lb 3.2 oz)     Gen: Alert, oriented, appropriately interactive, NAD  Resp: good effort without distress   Abdomen: soft, gravid, non tender, non distended, no masses    FHR: 140  FH: 32    Labs & Imaging:  A+, Ab-, RI, HIV-, HBSA-, Trep-  UCx wnl  Hgb 10.0 -> 11.5  GC/Chlam neg  PAP NIL  GCT 83     Ultrasound (19): 20w5d, Br, PP, DULCE 11.8, 39%tile, normal anatomy    ASSESSMENT/PLAN: Jenny Bradford is a 23 year old  at 32w3d by 11w3d Ultrasound who presents for DEBRA.     Pregnancy complicated by:  Late onset of cares  Bipolar disorder, not on meds: has established with psychology, scheduled to see psychiatry   Possible EtOH exposure 1st tri  Pelvic floor dysfunction      1. 32 weeks gestation of pregnancy  New OB labs, Anatomy Ultrasound, New OB exam all WNL  Passed GCT  S/p Tdap     2. Depression during pregnancy in second trimester  - has established with psychology, receiving counseling, scheduled to see psychiatry     3. Postpartum plans  - desires Vera (does not tolerate systemic hormones, liked Mirena but ongoing cramping)  - plans to breast feed  - unsure about peds     Jose Riddle MD   2019 11:01 AM

## 2019-10-02 ENCOUNTER — TELEPHONE (OUTPATIENT)
Dept: FAMILY MEDICINE | Facility: OTHER | Age: 24
End: 2019-10-02

## 2019-10-02 NOTE — TELEPHONE ENCOUNTER
Pt is 35w1d.  She had intercourse last night. She states she has not had intercourse for a long time.  She is concerned because after intercourse her partner's penis was bloody.  Pt states intercourse was a little uncomfortable but wasn't sure if it was the position they were in or since it has been awhile since they have had intercourse.  Pt has noticed slight spotting today.  Denies any pain.  Baby is still moving as normal.  Occasional jose jones but that is normal for her.  Since pt's bleeding has subsided and she isn't experiencing any other symptoms I advised pt to monitor.  I let her know that it can be common to experience some vaginal bleeding/spotting after intercourse as her cervix is more sensitive during pregnancy.  Advised to call back if any new symptoms arise or bleeding worsens.  Pt does have a prenatal visit on 10/4/19.  Pt verbalized understanding and agreed to plan.    Yocasta Bass RN

## 2019-10-04 ENCOUNTER — PRENATAL OFFICE VISIT (OUTPATIENT)
Dept: OBGYN | Facility: OTHER | Age: 24
End: 2019-10-04
Payer: MEDICAID

## 2019-10-04 VITALS
WEIGHT: 123.6 LBS | SYSTOLIC BLOOD PRESSURE: 112 MMHG | DIASTOLIC BLOOD PRESSURE: 78 MMHG | HEART RATE: 84 BPM | BODY MASS INDEX: 21.89 KG/M2

## 2019-10-04 DIAGNOSIS — O99.343 MENTAL DISORDER DURING PREGNANCY IN THIRD TRIMESTER: Primary | ICD-10-CM

## 2019-10-04 PROCEDURE — 99207 ZZC COMPLICATED OB VISIT: CPT | Performed by: OBSTETRICS & GYNECOLOGY

## 2019-10-04 PROCEDURE — 87653 STREP B DNA AMP PROBE: CPT | Performed by: OBSTETRICS & GYNECOLOGY

## 2019-10-04 NOTE — PROGRESS NOTES
DEBRA    Doing well. Feels very pregnant, hard to get comfortable to sleep. Regarding mood hx/possible bipolar disorder, recently started seeing a psychologist, receiving counseling and states it is helping. Also now s/p initial consultation with a psychiatrist, f/u scheduled, likely to start meds postpartum. Otherwise, +FM, denies contractions, VB, LOF, abnormal discharge, dysuria.     EXAM:  Vitals:    10/04/19 0959   BP: 112/78   BP Location: Right arm   Cuff Size: Adult Regular   Pulse: 84   Weight: 56.1 kg (123 lb 9.6 oz)       Gen: Alert, oriented, appropriately interactive, NAD  Resp: good effort without distress   Abdomen: soft, gravid, non tender, non distended, no masses  Vertex by Leopold's    FHR: 145  FH: 32    Labs & Imaging:  A+, Ab-, RI, HIV-, HBSA-, Trep-  UCx wnl  Hgb 10.0 -> 11.5  GC/Chlam neg  PAP NIL  GCT 83     Ultrasound (19): 20w5d, Br, PP, DULCE 11.8, 39%tile, normal anatomy    ASSESSMENT/PLAN: Jenny Bradford is a 24 year old  at 35w3d by 11w3d Ultrasound who presents for DEBRA.     Pregnancy complicated by:  Late onset of cares  Bipolar disorder, not on meds: has established with psychology, receiving counseling, also psychiatry, plans to start meds postpartum  Possible EtOH exposure 1st tri  Pelvic floor dysfunction/pain     1. pregnancy  New OB labs, Anatomy Ultrasound, New OB exam all WNL  Passed GCT  S/p Tdap  - Growth Ultrasound ordered given S<D  - Strep, Group B by PCR collected     2. Depression during pregnancy  - has established with psychology, receiving counseling, also psychiatry, plans to start meds postpartum     3. Postpartum plans  - desires Vera (does not tolerate systemic hormones, liked Mirena but ongoing cramping)  - plans to breast feed  - unsure about peds    Jose Riddle MD   10/4/2019 4:57 PM

## 2019-10-05 LAB
GP B STREP DNA SPEC QL NAA+PROBE: NEGATIVE
SPECIMEN SOURCE: NORMAL

## 2019-10-07 ENCOUNTER — ANCILLARY PROCEDURE (OUTPATIENT)
Dept: ULTRASOUND IMAGING | Facility: OTHER | Age: 24
End: 2019-10-07
Attending: OBSTETRICS & GYNECOLOGY
Payer: MEDICAID

## 2019-10-07 DIAGNOSIS — O99.343 MENTAL DISORDER DURING PREGNANCY IN THIRD TRIMESTER: ICD-10-CM

## 2019-10-07 PROCEDURE — 76805 OB US >/= 14 WKS SNGL FETUS: CPT

## 2019-10-14 ENCOUNTER — PRENATAL OFFICE VISIT (OUTPATIENT)
Dept: OBGYN | Facility: OTHER | Age: 24
End: 2019-10-14
Payer: MEDICAID

## 2019-10-14 VITALS
DIASTOLIC BLOOD PRESSURE: 54 MMHG | BODY MASS INDEX: 22.64 KG/M2 | WEIGHT: 127.8 LBS | HEART RATE: 78 BPM | SYSTOLIC BLOOD PRESSURE: 102 MMHG

## 2019-10-14 DIAGNOSIS — O99.343 MENTAL DISORDER DURING PREGNANCY IN THIRD TRIMESTER: Primary | ICD-10-CM

## 2019-10-14 PROCEDURE — 99207 ZZC COMPLICATED OB VISIT: CPT | Performed by: OBSTETRICS & GYNECOLOGY

## 2019-10-14 ASSESSMENT — PATIENT HEALTH QUESTIONNAIRE - PHQ9
10. IF YOU CHECKED OFF ANY PROBLEMS, HOW DIFFICULT HAVE THESE PROBLEMS MADE IT FOR YOU TO DO YOUR WORK, TAKE CARE OF THINGS AT HOME, OR GET ALONG WITH OTHER PEOPLE: NOT DIFFICULT AT ALL
SUM OF ALL RESPONSES TO PHQ QUESTIONS 1-9: 5
SUM OF ALL RESPONSES TO PHQ QUESTIONS 1-9: 5

## 2019-10-14 NOTE — NURSING NOTE
"Chief Complaint   Patient presents with     Prenatal Care       Initial /54 (BP Location: Right arm, Patient Position: Chair, Cuff Size: Adult Regular)   Pulse 78   Wt 58 kg (127 lb 12.8 oz)   LMP 2019 (Approximate)   BMI 22.64 kg/m   Estimated body mass index is 22.64 kg/m  as calculated from the following:    Height as of 19: 1.6 m (5' 3\").    Weight as of this encounter: 58 kg (127 lb 12.8 oz).  BP completed using cuff size: regular        PHQ-9 score:    PHQ-9 SCORE 10/14/2019   PHQ-9 Total Score -   PHQ-9 Total Score MyChart 5 (Mild depression)   PHQ-9 Total Score 5         Natalie Perez, [facility]  2019    "

## 2019-10-14 NOTE — PROGRESS NOTES
Answers for HPI/ROS submitted by the patient on 10/14/2019   If you checked off any problems, how difficult have these problems made it for you to do your work, take care of things at home, or get along with other people?: Not difficult at all  PHQ9 TOTAL SCORE: 5    DEBRA    Doing well. Feels very pregnant, lots of pelvic pressure, intermittent and spaced contractions, fetus very active at night. Some bloody show with sex a week ago, none since. Regarding mood hx/possible bipolar disorder, recently started seeing a psychologist, receiving counseling and states it is helping. Sees counselor again next week. Also now s/p initial consultation with a psychiatrist, f/u scheduled, likely to start meds postpartum. Otherwise, +FM, denies LOF, abnormal discharge, dysuria.     EXAM:  Vitals:    10/14/19 1007   BP: 102/54   BP Location: Right arm   Patient Position: Chair   Cuff Size: Adult Regular   Pulse: 78   Weight: 58 kg (127 lb 12.8 oz)     Gen: Alert, oriented, appropriately interactive, NAD  Resp: good effort without distress   Abdomen: soft, gravid, non tender, non distended, no masses  Vertex by BSUS    FHR: 165  FH: 34    Labs & Imaging:  A+, Ab-, RI, HIV-, HBSA-, Trep-  UCx wnl  Hgb 10.0 -> 11.5  GC/Chlam neg  PAP NIL  GCT 83     Ultrasound (19): 20w5d, Br, PP, DULCE 11.8, 39%tile, normal anatomy  Growth (10/7) 12%tile    ASSESSMENT/PLAN: Jenny Bradford is a 24 year old  at 36w6d by 11w3d Ultrasound who presents for DEBRA.     Pregnancy complicated by:  Late onset of cares  Bipolar disorder, not on meds: has established with psychology, receiving counseling, also psychiatry, plans to start meds postpartum  Possible EtOH exposure 1st tri  Pelvic floor dysfunction/pain     1. pregnancy  New OB labs, Anatomy Ultrasound, New OB exam all WNL  Passed GCT  S/p Tdap  Growth 12%tile on 10/7  GBS-     2. Depression during pregnancy  - has established with psychology, receiving counseling, also psychiatry, plans to  start meds postpartum     3. Postpartum plans  - desires Vera (does not tolerate systemic hormones, liked Mirena but ongoing cramping)  - plans to breast feed  - unsure about peds    Jose Riddle MD   10/14/2019 10:23 AM

## 2019-10-15 ASSESSMENT — PATIENT HEALTH QUESTIONNAIRE - PHQ9: SUM OF ALL RESPONSES TO PHQ QUESTIONS 1-9: 5

## 2019-10-21 ENCOUNTER — PRENATAL OFFICE VISIT (OUTPATIENT)
Dept: OBGYN | Facility: OTHER | Age: 24
End: 2019-10-21
Payer: MEDICAID

## 2019-10-21 VITALS
SYSTOLIC BLOOD PRESSURE: 98 MMHG | DIASTOLIC BLOOD PRESSURE: 60 MMHG | WEIGHT: 128.2 LBS | HEART RATE: 80 BPM | BODY MASS INDEX: 22.71 KG/M2

## 2019-10-21 DIAGNOSIS — O99.343 MENTAL DISORDER DURING PREGNANCY IN THIRD TRIMESTER: Primary | ICD-10-CM

## 2019-10-21 PROCEDURE — 99207 ZZC PRENATAL VISIT: CPT | Performed by: OBSTETRICS & GYNECOLOGY

## 2019-10-21 NOTE — NURSING NOTE
"Chief Complaint   Patient presents with     Prenatal Care       Initial BP 98/60 (BP Location: Right arm, Patient Position: Chair, Cuff Size: Adult Regular)   Pulse 80   Wt 58.2 kg (128 lb 3.2 oz)   LMP 2019 (Approximate)   BMI 22.71 kg/m   Estimated body mass index is 22.71 kg/m  as calculated from the following:    Height as of 19: 1.6 m (5' 3\").    Weight as of this encounter: 58.2 kg (128 lb 3.2 oz).  BP completed using cuff size: regular        PHQ-9 score:    PHQ-9 SCORE 10/14/2019   PHQ-9 Total Score -   PHQ-9 Total Score MyChart 5 (Mild depression)   PHQ-9 Total Score 5       Natalie Perez [facility]  2019    "

## 2019-10-21 NOTE — PROGRESS NOTES
DEBRA    Doing well. Feels very pregnant, lots of pelvic pressure, intermittent and spaced contractions, fetus very active at night. Regarding hx of chronic pelvic floor pain, dyspareunia has been significantly improved in pregnancy. Regarding mood hx/possible bipolar disorder, recently started seeing a psychologist, receiving counseling and states it is helping. Sees counselor again this week. Also now s/p initial consultation with a psychiatrist, f/u scheduled, likely to start meds postpartum. Otherwise, +FM, denies LOF, abnormal discharge, dysuria.     EXAM:  Vitals:    10/21/19 0959   BP: 98/60   BP Location: Right arm   Patient Position: Chair   Cuff Size: Adult Regular   Pulse: 80   Weight: 58.2 kg (128 lb 3.2 oz)       Gen: Alert, oriented, appropriately interactive, NAD  Resp: good effort without distress   Abdomen: soft, gravid, non tender, non distended, no masses    FHR: 135  FH: 36    Labs & Imaging:  A+, Ab-, RI, HIV-, HBSA-, Trep-  UCx wnl  Hgb 10.0 -> 11.5  GC/Chlam neg  PAP NIL  GCT 83     Ultrasound (19): 20w5d, Br, PP, DULCE 11.8, 39%tile, normal anatomy  Growth (10/7) 12%tile    ASSESSMENT/PLAN: Jenny Bradford is a 24 year old  at 37w6d by 11w3d Ultrasound who presents for DEBRA.     Pregnancy complicated by:  Late onset of cares  Bipolar disorder, not on meds: has established with psychology, receiving counseling, also psychiatry, plans to start meds postpartum  Possible EtOH exposure 1st tri  Pelvic floor dysfunction/pain     1. pregnancy  New OB labs, Anatomy Ultrasound, New OB exam all WNL  Passed GCT  S/p Tdap  Growth 12%tile on 10/7  GBS-     2. Depression during pregnancy  - has established with psychology, receiving counseling, also psychiatry, plans to start meds postpartum     3. Postpartum plans  - desires Vera (does not tolerate systemic hormones, liked Mirena but ongoing cramping)  - plans to breast feed  - unsure about peds    Jose Riddle MD   10/21/2019 10:46  AM

## 2019-10-29 ENCOUNTER — PRENATAL OFFICE VISIT (OUTPATIENT)
Dept: OBGYN | Facility: CLINIC | Age: 24
End: 2019-10-29
Payer: MEDICAID

## 2019-10-29 ENCOUNTER — ANESTHESIA (OUTPATIENT)
Dept: OBGYN | Facility: CLINIC | Age: 24
End: 2019-10-29
Payer: MEDICAID

## 2019-10-29 ENCOUNTER — ANESTHESIA EVENT (OUTPATIENT)
Dept: OBGYN | Facility: CLINIC | Age: 24
End: 2019-10-29
Payer: MEDICAID

## 2019-10-29 ENCOUNTER — HOSPITAL ENCOUNTER (INPATIENT)
Facility: CLINIC | Age: 24
LOS: 3 days | Discharge: HOME | End: 2019-11-01
Attending: OBSTETRICS & GYNECOLOGY | Admitting: OBSTETRICS & GYNECOLOGY
Payer: MEDICAID

## 2019-10-29 VITALS
BODY MASS INDEX: 23.29 KG/M2 | DIASTOLIC BLOOD PRESSURE: 60 MMHG | WEIGHT: 131.5 LBS | HEART RATE: 74 BPM | SYSTOLIC BLOOD PRESSURE: 110 MMHG

## 2019-10-29 DIAGNOSIS — O99.343 MENTAL DISORDER DURING PREGNANCY IN THIRD TRIMESTER: Primary | ICD-10-CM

## 2019-10-29 PROBLEM — Z34.90 NORMAL PREGNANCY: Status: ACTIVE | Noted: 2019-10-29

## 2019-10-29 LAB
ABO + RH BLD: NORMAL
ABO + RH BLD: NORMAL
BLD GP AB SCN SERPL QL: NORMAL
BLOOD BANK CMNT PATIENT-IMP: NORMAL
HGB BLD-MCNC: 13.4 G/DL (ref 11.7–15.7)
PLATELET # BLD AUTO: 188 10E9/L (ref 150–450)
SPECIMEN EXP DATE BLD: NORMAL

## 2019-10-29 PROCEDURE — 25800030 ZZH RX IP 258 OP 636: Performed by: NURSE ANESTHETIST, CERTIFIED REGISTERED

## 2019-10-29 PROCEDURE — 40000671 ZZH STATISTIC ANESTHESIA CASE

## 2019-10-29 PROCEDURE — 99207 ZZC PRENATAL VISIT: CPT | Performed by: OBSTETRICS & GYNECOLOGY

## 2019-10-29 PROCEDURE — 25000128 H RX IP 250 OP 636: Performed by: OBSTETRICS & GYNECOLOGY

## 2019-10-29 PROCEDURE — 86901 BLOOD TYPING SEROLOGIC RH(D): CPT | Performed by: OBSTETRICS & GYNECOLOGY

## 2019-10-29 PROCEDURE — 37000011 ZZH ANESTHESIA WARD SERVICE: Performed by: NURSE ANESTHETIST, CERTIFIED REGISTERED

## 2019-10-29 PROCEDURE — 12000000 ZZH R&B MED SURG/OB

## 2019-10-29 PROCEDURE — 25000125 ZZHC RX 250

## 2019-10-29 PROCEDURE — 25000128 H RX IP 250 OP 636: Performed by: NURSE ANESTHETIST, CERTIFIED REGISTERED

## 2019-10-29 PROCEDURE — 85018 HEMOGLOBIN: CPT | Performed by: OBSTETRICS & GYNECOLOGY

## 2019-10-29 PROCEDURE — 85049 AUTOMATED PLATELET COUNT: CPT | Performed by: OBSTETRICS & GYNECOLOGY

## 2019-10-29 PROCEDURE — 86850 RBC ANTIBODY SCREEN: CPT | Performed by: OBSTETRICS & GYNECOLOGY

## 2019-10-29 PROCEDURE — 10907ZC DRAINAGE OF AMNIOTIC FLUID, THERAPEUTIC FROM PRODUCTS OF CONCEPTION, VIA NATURAL OR ARTIFICIAL OPENING: ICD-10-PCS | Performed by: OBSTETRICS & GYNECOLOGY

## 2019-10-29 PROCEDURE — 86900 BLOOD TYPING SEROLOGIC ABO: CPT | Performed by: OBSTETRICS & GYNECOLOGY

## 2019-10-29 PROCEDURE — 86780 TREPONEMA PALLIDUM: CPT | Performed by: OBSTETRICS & GYNECOLOGY

## 2019-10-29 PROCEDURE — 25000125 ZZHC RX 250: Performed by: NURSE ANESTHETIST, CERTIFIED REGISTERED

## 2019-10-29 RX ORDER — NALOXONE HYDROCHLORIDE 0.4 MG/ML
.1-.4 INJECTION, SOLUTION INTRAMUSCULAR; INTRAVENOUS; SUBCUTANEOUS
Status: DISCONTINUED | OUTPATIENT
Start: 2019-10-29 | End: 2019-10-29

## 2019-10-29 RX ORDER — LIDOCAINE HYDROCHLORIDE AND EPINEPHRINE 15; 5 MG/ML; UG/ML
INJECTION, SOLUTION EPIDURAL PRN
Status: DISCONTINUED | OUTPATIENT
Start: 2019-10-29 | End: 2019-10-30

## 2019-10-29 RX ORDER — IBUPROFEN 800 MG/1
800 TABLET, FILM COATED ORAL
Status: DISCONTINUED | OUTPATIENT
Start: 2019-10-29 | End: 2019-10-30

## 2019-10-29 RX ORDER — PHENYLEPHRINE HCL IN 0.9% NACL 1 MG/10 ML
100 SYRINGE (ML) INTRAVENOUS EVERY 5 MIN PRN
Status: DISCONTINUED | OUTPATIENT
Start: 2019-10-29 | End: 2019-10-30

## 2019-10-29 RX ORDER — CARBOPROST TROMETHAMINE 250 UG/ML
250 INJECTION, SOLUTION INTRAMUSCULAR
Status: DISCONTINUED | OUTPATIENT
Start: 2019-10-29 | End: 2019-10-30

## 2019-10-29 RX ORDER — NALBUPHINE HYDROCHLORIDE 10 MG/ML
2.5-5 INJECTION, SOLUTION INTRAMUSCULAR; INTRAVENOUS; SUBCUTANEOUS EVERY 6 HOURS PRN
Status: DISCONTINUED | OUTPATIENT
Start: 2019-10-29 | End: 2019-10-30

## 2019-10-29 RX ORDER — OXYCODONE AND ACETAMINOPHEN 5; 325 MG/1; MG/1
1 TABLET ORAL
Status: DISCONTINUED | OUTPATIENT
Start: 2019-10-29 | End: 2019-10-30

## 2019-10-29 RX ORDER — METHYLERGONOVINE MALEATE 0.2 MG/ML
200 INJECTION INTRAVENOUS
Status: DISCONTINUED | OUTPATIENT
Start: 2019-10-29 | End: 2019-10-30

## 2019-10-29 RX ORDER — BUPIVACAINE HYDROCHLORIDE 2.5 MG/ML
INJECTION, SOLUTION INFILTRATION; PERINEURAL PRN
Status: DISCONTINUED | OUTPATIENT
Start: 2019-10-29 | End: 2019-10-30

## 2019-10-29 RX ORDER — SODIUM CHLORIDE, SODIUM LACTATE, POTASSIUM CHLORIDE, CALCIUM CHLORIDE 600; 310; 30; 20 MG/100ML; MG/100ML; MG/100ML; MG/100ML
INJECTION, SOLUTION INTRAVENOUS CONTINUOUS
Status: DISCONTINUED | OUTPATIENT
Start: 2019-10-29 | End: 2019-10-30

## 2019-10-29 RX ORDER — EPHEDRINE SULFATE 50 MG/ML
INJECTION, SOLUTION INTRAMUSCULAR; INTRAVENOUS; SUBCUTANEOUS
Status: COMPLETED
Start: 2019-10-29 | End: 2019-10-29

## 2019-10-29 RX ORDER — ACETAMINOPHEN 325 MG/1
650 TABLET ORAL EVERY 4 HOURS PRN
Status: DISCONTINUED | OUTPATIENT
Start: 2019-10-29 | End: 2019-10-30

## 2019-10-29 RX ORDER — LIDOCAINE HYDROCHLORIDE 10 MG/ML
INJECTION, SOLUTION EPIDURAL; INFILTRATION; INTRACAUDAL; PERINEURAL
Status: DISCONTINUED
Start: 2019-10-29 | End: 2019-10-30 | Stop reason: WASHOUT

## 2019-10-29 RX ORDER — FENTANYL CITRATE 50 UG/ML
50-100 INJECTION, SOLUTION INTRAMUSCULAR; INTRAVENOUS
Status: DISCONTINUED | OUTPATIENT
Start: 2019-10-29 | End: 2019-10-30

## 2019-10-29 RX ORDER — NALOXONE HYDROCHLORIDE 0.4 MG/ML
.1-.4 INJECTION, SOLUTION INTRAMUSCULAR; INTRAVENOUS; SUBCUTANEOUS
Status: DISCONTINUED | OUTPATIENT
Start: 2019-10-29 | End: 2019-10-30

## 2019-10-29 RX ORDER — OXYTOCIN 10 [USP'U]/ML
10 INJECTION, SOLUTION INTRAMUSCULAR; INTRAVENOUS
Status: COMPLETED | OUTPATIENT
Start: 2019-10-29 | End: 2019-10-30

## 2019-10-29 RX ORDER — LIDOCAINE HYDROCHLORIDE 10 MG/ML
INJECTION, SOLUTION EPIDURAL; INFILTRATION; INTRACAUDAL; PERINEURAL
Status: DISCONTINUED
Start: 2019-10-29 | End: 2019-10-29 | Stop reason: HOSPADM

## 2019-10-29 RX ORDER — OXYTOCIN/0.9 % SODIUM CHLORIDE 30/500 ML
100-340 PLASTIC BAG, INJECTION (ML) INTRAVENOUS CONTINUOUS PRN
Status: DISCONTINUED | OUTPATIENT
Start: 2019-10-29 | End: 2019-10-30

## 2019-10-29 RX ORDER — ONDANSETRON 2 MG/ML
4 INJECTION INTRAMUSCULAR; INTRAVENOUS EVERY 6 HOURS PRN
Status: DISCONTINUED | OUTPATIENT
Start: 2019-10-29 | End: 2019-10-30

## 2019-10-29 RX ADMIN — BUPIVACAINE HYDROCHLORIDE 10 ML: 2.5 INJECTION, SOLUTION INFILTRATION; PERINEURAL at 21:53

## 2019-10-29 RX ADMIN — FENTANYL CITRATE 100 MCG: 50 INJECTION INTRAMUSCULAR; INTRAVENOUS at 21:21

## 2019-10-29 RX ADMIN — BUPIVACAINE HYDROCHLORIDE: 5 INJECTION, SOLUTION EPIDURAL; INTRACAUDAL; PERINEURAL at 21:59

## 2019-10-29 RX ADMIN — Medication 25 MG: at 22:34

## 2019-10-29 RX ADMIN — LIDOCAINE HYDROCHLORIDE,EPINEPHRINE BITARTRATE 3 ML: 15; .005 INJECTION, SOLUTION EPIDURAL; INFILTRATION; INTRACAUDAL; PERINEURAL at 21:46

## 2019-10-29 RX ADMIN — SODIUM CHLORIDE, POTASSIUM CHLORIDE, SODIUM LACTATE AND CALCIUM CHLORIDE 1000 ML: 600; 310; 30; 20 INJECTION, SOLUTION INTRAVENOUS at 21:22

## 2019-10-29 RX ADMIN — BUPIVACAINE HYDROCHLORIDE 10 ML/HR: 5 INJECTION, SOLUTION EPIDURAL; INTRACAUDAL; PERINEURAL at 22:00

## 2019-10-29 RX ADMIN — LIDOCAINE HYDROCHLORIDE 0.1 ML: 10 INJECTION, SOLUTION EPIDURAL; INFILTRATION; INTRACAUDAL; PERINEURAL at 18:29

## 2019-10-29 NOTE — H&P
[facility] Labor and Delivery History and Physical    Jenny Bradford MRN# [id number]   Age: 24 year old YOB: 1995     Date of Admission:  10/29/2019    Primary care provider: Bailey Medellin         Chief Complaint:   Jenny Bradford is a 24 year old female who is 39w0d pregnant and being admitted for active cervical change and regular contractions. She was seen this morning by Dr. Riddle, who stripped her membranes x2. At her appointment with Dr. Riddle this morning, she was 2 cm dilated and -3 station. On admission this evening, she has progressed to a 5 cm and at a 0 station. She reports feeling contractions regularly, approx every 4-5 minutes. She is still feeling baby move and has had no rush of fluids or bloody discharge.          Pregnancy history:     OBSTETRIC HISTORY:    OB History    Para Term  AB Living   1 0 0 0 0 0   SAB TAB Ectopic Multiple Live Births   0 0 0 0 0      # Outcome Date GA Lbr Sujit/2nd Weight Sex Delivery Anes PTL Lv   1 Current               Obstetric Comments   EDC est 2019 based on early pregnancy symptoms and first pos. UCG.  Parenting with [facility].       EDC: Estimated Date of Delivery: 19    Prenatal Labs:   Lab Results   Component Value Date    ABO A 2019    RH Pos 2019    AS Neg 2019    HEPBANG Nonreactive 2019    CHPCRT Negative 2019    GCPCRT Negative 2019    HGB 11.5 (L) 2019     GBS Status:   Lab Results   Component Value Date    GBS Negative 10/04/2019     Active Problem List  Patient Active Problem List   Diagnosis     CARDIOVASCULAR SCREENING; LDL GOAL LESS THAN 160     Anxiety     Mild major depression (H)     Depo-Provera contraceptive status     Contraception     Tobacco use disorder     Pelvic floor dysfunction- Sees Orville Damian.     Encounter for triage in pregnant patient     Medication Prior to Admission  Medications Prior to Admission   Medication Sig  Dispense Refill Last Dose     Famotidine (PEPCID PO) Take 1-2 tablets by mouth daily    10/28/2019 at 1500     ferrous gluconate (FERGON) 324 (38 Fe) MG tablet Take 1 tablet (324 mg) by mouth daily (with breakfast) 60 tablet 3 10/28/2019 at 1500     Prenatal Vit-Fe Fumarate-FA (PRENATAL MULTIVITAMIN W/IRON) 27-0.8 MG tablet Take 1 tablet by mouth daily   10/28/2019 at 1500     Prenatal Vit-Fe Fumarate-FA (PRENATAL VITAMIN) 27-0.8 MG TABS Take 1 tablet by mouth every 24 hours 90 tablet 3 10/28/2019 at 1500     ondansetron (ZOFRAN-ODT) 4 MG ODT tab Take 1 tablet (4 mg) by mouth every 8 hours as needed for nausea (Patient not taking: Reported on 10/29/2019) 30 tablet 0 Not Taking     polyethylene glycol (MIRALAX/GLYCOLAX) powder Take 17 g (1 capful) by mouth daily (Patient not taking: Reported on 2019) 1 Bottle 3 Not Taking     ranitidine (ZANTAC) 75 MG tablet Take 150 mg by mouth 2 times daily   More than a month at Unknown time   .        Maternal Past Medical History:     Past Medical History:   Diagnosis Date     Asthma      Ovarian cyst                        Family History:     Family History   Problem Relation Age of Onset     Thyroid Disease Mother      Asthma Mother      Ovarian cysts Mother      No Known Problems Father      No Known Problems Maternal Grandmother      Hypertension Maternal Grandfather      No Known Problems Paternal Grandmother      No Known Problems Paternal Grandfather      Asthma Half-Sister      Asthma Half-Sister      Asthma Half-Brother      Asthma Half-Brother      Asthma Half-Brother      Family history reviewed  Family Status   Relation Name Status     Chapincito Urbano Aspen Alive     Fa Juan A Alive     MGMo  Alive     MGFa  Alive     PGMo  Alive     PGFa       Half-Sister Amie Alive     Half-Sister Stephie Alive     Half-Brother Jimbo Alive     Half-Brother Hugo Alive     Half-Brother Sunministerio Alive       Family history   reviewed         Social History:     Social History      Tobacco Use     Smoking status: Former Smoker     Packs/day: 0.10     Types: Cigarettes     Smokeless tobacco: Never Used   Substance Use Topics     Alcohol use: Not Currently     Comment: rare            Review of Systems:   RESP: NEGATIVE for significant cough or SOB  CV: NEGATIVE for chest pain, palpitations or peripheral edema  Patient also reports sharp pain across her abdomen during contraction and a constant dull pain through her back.          Physical Exam:     Patient Vitals for the past 72 hrs:   BP Temp Temp src Pulse   10/29/19 1738 108/72 97.9  F (36.6  C) Oral 86     Constitutional: Awake, alert, cooperative, no apparent distress, and appears stated age.  Eyes: Lids and lashes normal, extra ocular muscles intact, sclera clear, conjunctiva normal.  Lungs: No increased work of breathing, good air exchange, clear to auscultation bilaterally, no crackles or wheezing.  Cardiovascular: Regular rate and rhythm, normal S1 and S2, no S3 or S4, and no murmur noted.  Abdomen: gravid, non tender  psychiatric: Normal affect, mood, and insight.  Cervix:   Membranes: intact   Dilation: 5   Effacement: 90%   Station:0   Consistency: soft   Position: Mid  Presentation:Cephalic by VE  Fetal Heart Rate Tracing: reactive and reassuring.    FHR baseline at 125 with moderate variability, accelerations but no decelerations, approximately 4 contractions in 10 minutes, FHR reactive  Tocometer: external monitor                        Assessment:   Jenny Bradford is a 39w0d G1 at 39+0 by by 11w3d Ultrasound who presents for latent labor with regular painful contractions and active cervical change.     Pregnancy complicated by:  Late onset of cares  Bipolar disorder, not on meds: has established with psychology, receiving counseling, also psychiatry, plans to start meds postpartum  Possible EtOH exposure 1st tri  Pelvic floor dysfunction/pain          Plan:   Admit to L&D for labor management  FHT category I  reactive  Cervix with spontaneous change, augmentation only if indicated  GBS-, Rh+, RI  Pain medication - patient would like to start with nitrous oxide for pain management. If her symptoms are unmanageable, she has said that she would then consider an alternative like fentanyl or an epidural.   Anticipate     Jose Riddle MD  OB/GYN  2019, 8:32 PM

## 2019-10-29 NOTE — PROGRESS NOTES
"S: Triage Arrival  B: Jenny is a 24 y.o.  @ 39w 39d who presents with complaint of contractions  A: EFM applied. FHT's115 with moderate variability, accels present, no decels noted on strip. Contractions q3-5\", palpate mod  R: Will notify MD to obtain further orders.  "

## 2019-10-29 NOTE — PLAN OF CARE
S:  Admission  B:  Jenny is a  @ 39w0d gestation, GBS neg, Hep. B neg, pregnancy uncomplicated. EFW 6#12oz per MD  A:  Patient admitted to room 334 for labor.  here at bedside now. Discussed pain relief options with pt  R:  Routine labor cares

## 2019-10-30 LAB — T PALLIDUM AB SER QL: NONREACTIVE

## 2019-10-30 PROCEDURE — 12000000 ZZH R&B MED SURG/OB

## 2019-10-30 PROCEDURE — 25800030 ZZH RX IP 258 OP 636: Performed by: OBSTETRICS & GYNECOLOGY

## 2019-10-30 PROCEDURE — 59400 OBSTETRICAL CARE: CPT | Performed by: OBSTETRICS & GYNECOLOGY

## 2019-10-30 PROCEDURE — 00HU33Z INSERTION OF INFUSION DEVICE INTO SPINAL CANAL, PERCUTANEOUS APPROACH: ICD-10-PCS | Performed by: OBSTETRICS & GYNECOLOGY

## 2019-10-30 PROCEDURE — 3E0R3BZ INTRODUCTION OF ANESTHETIC AGENT INTO SPINAL CANAL, PERCUTANEOUS APPROACH: ICD-10-PCS | Performed by: OBSTETRICS & GYNECOLOGY

## 2019-10-30 PROCEDURE — 72200001 ZZH LABOR CARE VAGINAL DELIVERY SINGLE

## 2019-10-30 PROCEDURE — 25000128 H RX IP 250 OP 636: Performed by: OBSTETRICS & GYNECOLOGY

## 2019-10-30 PROCEDURE — 25000132 ZZH RX MED GY IP 250 OP 250 PS 637: Performed by: OBSTETRICS & GYNECOLOGY

## 2019-10-30 RX ORDER — CARBOPROST TROMETHAMINE 250 UG/ML
250 INJECTION, SOLUTION INTRAMUSCULAR
Status: DISCONTINUED | OUTPATIENT
Start: 2019-10-30 | End: 2019-11-01 | Stop reason: HOSPADM

## 2019-10-30 RX ORDER — METHYLERGONOVINE MALEATE 0.2 MG/ML
200 INJECTION INTRAVENOUS
Status: DISCONTINUED | OUTPATIENT
Start: 2019-10-30 | End: 2019-11-01 | Stop reason: HOSPADM

## 2019-10-30 RX ORDER — OXYTOCIN/0.9 % SODIUM CHLORIDE 30/500 ML
100 PLASTIC BAG, INJECTION (ML) INTRAVENOUS CONTINUOUS
Status: DISCONTINUED | OUTPATIENT
Start: 2019-10-30 | End: 2019-11-01 | Stop reason: HOSPADM

## 2019-10-30 RX ORDER — LANOLIN 100 %
OINTMENT (GRAM) TOPICAL
Status: DISCONTINUED | OUTPATIENT
Start: 2019-10-30 | End: 2019-11-01 | Stop reason: HOSPADM

## 2019-10-30 RX ORDER — METOCLOPRAMIDE HYDROCHLORIDE 5 MG/ML
5 INJECTION INTRAMUSCULAR; INTRAVENOUS ONCE
Status: COMPLETED | OUTPATIENT
Start: 2019-10-30 | End: 2019-10-30

## 2019-10-30 RX ORDER — HYDROCORTISONE 2.5 %
CREAM (GRAM) TOPICAL 3 TIMES DAILY PRN
Status: DISCONTINUED | OUTPATIENT
Start: 2019-10-30 | End: 2019-11-01 | Stop reason: HOSPADM

## 2019-10-30 RX ORDER — AMOXICILLIN 250 MG
1 CAPSULE ORAL 2 TIMES DAILY
Status: DISCONTINUED | OUTPATIENT
Start: 2019-10-30 | End: 2019-11-01 | Stop reason: HOSPADM

## 2019-10-30 RX ORDER — AMOXICILLIN 250 MG
2 CAPSULE ORAL 2 TIMES DAILY
Status: DISCONTINUED | OUTPATIENT
Start: 2019-10-30 | End: 2019-11-01 | Stop reason: HOSPADM

## 2019-10-30 RX ORDER — ONDANSETRON 2 MG/ML
4 INJECTION INTRAMUSCULAR; INTRAVENOUS ONCE
Status: COMPLETED | OUTPATIENT
Start: 2019-10-30 | End: 2019-10-30

## 2019-10-30 RX ORDER — OXYTOCIN 10 [USP'U]/ML
10 INJECTION, SOLUTION INTRAMUSCULAR; INTRAVENOUS
Status: DISCONTINUED | OUTPATIENT
Start: 2019-10-30 | End: 2019-11-01 | Stop reason: HOSPADM

## 2019-10-30 RX ORDER — BISACODYL 10 MG
10 SUPPOSITORY, RECTAL RECTAL DAILY PRN
Status: DISCONTINUED | OUTPATIENT
Start: 2019-11-01 | End: 2019-11-01 | Stop reason: HOSPADM

## 2019-10-30 RX ORDER — OXYTOCIN/0.9 % SODIUM CHLORIDE 30/500 ML
340 PLASTIC BAG, INJECTION (ML) INTRAVENOUS CONTINUOUS PRN
Status: DISCONTINUED | OUTPATIENT
Start: 2019-10-30 | End: 2019-11-01 | Stop reason: HOSPADM

## 2019-10-30 RX ORDER — MAGNESIUM SULFATE 1 G/100ML
1 INJECTION INTRAVENOUS ONCE
Status: COMPLETED | OUTPATIENT
Start: 2019-10-30 | End: 2019-10-30

## 2019-10-30 RX ORDER — ACETAMINOPHEN 325 MG/1
650 TABLET ORAL EVERY 4 HOURS PRN
Status: DISCONTINUED | OUTPATIENT
Start: 2019-10-30 | End: 2019-11-01 | Stop reason: HOSPADM

## 2019-10-30 RX ORDER — IBUPROFEN 800 MG/1
800 TABLET, FILM COATED ORAL EVERY 6 HOURS PRN
Status: DISCONTINUED | OUTPATIENT
Start: 2019-10-30 | End: 2019-11-01 | Stop reason: HOSPADM

## 2019-10-30 RX ORDER — DIPHENHYDRAMINE HYDROCHLORIDE 50 MG/ML
25 INJECTION INTRAMUSCULAR; INTRAVENOUS ONCE
Status: COMPLETED | OUTPATIENT
Start: 2019-10-30 | End: 2019-10-30

## 2019-10-30 RX ORDER — NALOXONE HYDROCHLORIDE 0.4 MG/ML
.1-.4 INJECTION, SOLUTION INTRAMUSCULAR; INTRAVENOUS; SUBCUTANEOUS
Status: DISCONTINUED | OUTPATIENT
Start: 2019-10-30 | End: 2019-11-01 | Stop reason: HOSPADM

## 2019-10-30 RX ADMIN — ACETAMINOPHEN 650 MG: 325 TABLET, FILM COATED ORAL at 12:45

## 2019-10-30 RX ADMIN — SENNOSIDES AND DOCUSATE SODIUM 1 TABLET: 8.6; 5 TABLET ORAL at 22:34

## 2019-10-30 RX ADMIN — DIPHENHYDRAMINE HYDROCHLORIDE 25 MG: 50 INJECTION, SOLUTION INTRAMUSCULAR; INTRAVENOUS at 14:58

## 2019-10-30 RX ADMIN — ONDANSETRON 4 MG: 2 INJECTION INTRAMUSCULAR; INTRAVENOUS at 14:59

## 2019-10-30 RX ADMIN — IBUPROFEN 800 MG: 800 TABLET ORAL at 18:15

## 2019-10-30 RX ADMIN — IBUPROFEN 800 MG: 800 TABLET ORAL at 10:59

## 2019-10-30 RX ADMIN — IBUPROFEN 800 MG: 800 TABLET ORAL at 04:30

## 2019-10-30 RX ADMIN — MAGNESIUM SULFATE HEPTAHYDRATE 1 G: 1 INJECTION, SOLUTION INTRAVENOUS at 15:06

## 2019-10-30 RX ADMIN — SENNOSIDES AND DOCUSATE SODIUM 1 TABLET: 8.6; 5 TABLET ORAL at 08:01

## 2019-10-30 RX ADMIN — SODIUM CHLORIDE, POTASSIUM CHLORIDE, SODIUM LACTATE AND CALCIUM CHLORIDE: 600; 310; 30; 20 INJECTION, SOLUTION INTRAVENOUS at 14:46

## 2019-10-30 RX ADMIN — ACETAMINOPHEN 650 MG: 325 TABLET, FILM COATED ORAL at 22:33

## 2019-10-30 RX ADMIN — OXYTOCIN 10 UNITS: 10 INJECTION, SOLUTION INTRAMUSCULAR; INTRAVENOUS at 01:37

## 2019-10-30 RX ADMIN — METOCLOPRAMIDE 5 MG: 5 INJECTION, SOLUTION INTRAMUSCULAR; INTRAVENOUS at 15:02

## 2019-10-30 NOTE — ANESTHESIA POSTPROCEDURE EVALUATION
"Patient: Jenny Bradford    * No procedures listed *    Diagnosis:* No pre-op diagnosis entered *  Diagnosis Additional Information: No value filed.    Anesthesia Type:  Epidural    Note:  Anesthesia Post Evaluation    Patient location during evaluation: Floor  Patient participation: Able to fully participate in evaluation  Level of consciousness: awake and alert  Pain management: adequate  Airway patency: patent  Cardiovascular status: blood pressure returned to baseline  Respiratory status: spontaneous ventilation and room air  Hydration status: acceptable  PONV: none     Anesthetic complications: None    Comments: Patient was very pleased with her labor epidural for pain management.  She has been up walking around without incident and her sensory/motor function has returned to baseline.  She is currently complaining of a \"Migraine\" and says she has had these in the past.  Her HA does not resolve with sitting or laying down but is constant.  Will follow patient as necessary. No anesthesia concerns.         Last vitals:  Vitals:    10/30/19 0355 10/30/19 0500 10/30/19 0800   BP: 104/61 102/64 104/65   Pulse:   87   Resp:   16   Temp:   98.1  F (36.7  C)   SpO2:            Electronically Signed By: RAMONA Dai CRNA  October 30, 2019  2:35 PM  "

## 2019-10-30 NOTE — PROGRESS NOTES
S: Delivery   B: Mom  now. GBS negative, spontaneous labor   A: AROM with clear fluid @ 0945, followed by  of  @ 0131. Dr. Herbert for delivery. Spontaneous lusty cry on moms chest, bulb suctioned. Apgars 9/10   R: Anticipate routine post delivery mom and  cares.

## 2019-10-30 NOTE — ANESTHESIA CARE TRANSFER NOTE
Patient: Jenny Bradford    * No procedures listed *    Diagnosis: * No pre-op diagnosis entered *  Diagnosis Additional Information: No value filed.    Anesthesia Type:   Epidural     Note:  Airway :Room Air  Patient transferred to:Labor and Delivery  Handoff Report: Identifed the Patient, Identified the Reponsible Provider, Reviewed the pertinent medical history, Discussed the surgical course, Reviewed Intra-OP anesthesia mangement and issues during anesthesia, Set expectations for post-procedure period and Allowed opportunity for questions and acknowledgement of understanding      Vitals: (Last set prior to Anesthesia Care Transfer)              Electronically Signed By: RAMONA Dai CRNA  October 30, 2019  2:35 PM  
room air

## 2019-10-30 NOTE — ANESTHESIA PROCEDURE NOTES
Peripheral nerve/Neuraxial procedure note : epidural catheter  Pre-Procedure  Performed by  Luis Blakely APRN CRNA   Location: OB      Pre-Anesthestic Checklist: patient identified, IV checked, risks and benefits discussed, informed consent, monitors and equipment checked, pre-op evaluation and at physician/surgeon's request    Timeout  Correct Patient: Yes   Correct Procedure: Yes   Correct Site: Yes   Correct Laterality: N/A   Correct Position: Yes   Site Marked: N/A   .   Procedure Documentation    Diagnosis:Active labor.    Procedure: epidural catheter, .   Patient Position:sitting Insertion Site:L3-4  (midline approach) Injection technique: LORT air   Local skin infiltrated with 3 mL of 1% lidocaine.  MELODY at 6 cm    Patient Prep/Sterile Barriers; mask, sterile gloves, povidone-iodine 7.5% surgical scrub, patient draped.  .  Needle: Touhy needle, Monahan   Needle Gauge: 18.    Needle Length (Inches) 3.5   # of attempts: 1 and # of redirects:  .    Catheter: 20 G . .  Catheter threaded easily  4 cm epidural space.  .   .    Assessment/Narrative  Paresthesias: No.  .  .  Aspiration negative for heme or CSF  . Test dose of 3 mL lidocaine 1.5% w/ 1:200,000 epinephrine at. Test dose negative for signs of intravascular, subdural or intrathecal injection. Sensory Level Left: T6  Sensory Level Right: T6  Comments:  Patient tolerated epidural catheter placement well. There were no anesthesia related complications noted. Will follow as needed.

## 2019-10-30 NOTE — PROGRESS NOTES
Becoming more uncomfortable. Requesting nitrous. Nitrous education done and consent signed. Using nitrous.

## 2019-10-30 NOTE — L&D DELIVERY NOTE
OB Vaginal Delivery Note    Jenny Bradford MRN# [id number]   Age: 24 year old YOB: 1995       GA: 39w1d  GP:   Labor Complications: None   EBL:    mL  Delivery QBL:    Delivery Type: Vaginal, Spontaneous   ROM to Delivery Time: (Delivered) Hours: 3 Minutes: 16   Weight: 3.09 kg (6 lb 13 oz)    1 Minute 5 Minute 10 Minute   Apgar Totals: 9    10                Delivery Details:  Jenny Bradford, a 24 year old  female delivered a viable infant with apgars of 9   and 10  . Patient was fully dilated and pushing after 7  hours 5  minutes in active labor. Delivery was via vaginal, spontaneous  to a sterile field under epidural;nitrous oxide;intravenous regional  anesthesia. Infant delivered in vertex  right  occiput  transverse  position. Anterior and posterior shoulders delivered without difficulty. The cord was clamped, cut twice and 3 vessels  were noted. Cord blood was obtained in routine fashion with the following disposition: lab .      Cord complications: nuchal   Placenta delivered at 10/30/2019  1:35 AM . Placental disposition was Hospital disposal . Fundal massage performed and fundus found to be firm.     Episiotomy: none    Perineum, vagina, cervix were inspected, and the following lacerations were noted:   Perineal lacerations: none   periurethral laceration: right  labial laceration: left              Any lacerations were repaired in the usual fashion using 3-0 & 4-0 Vicryl    Excellent hemostasis was noted. Needle count correct. Infant and patient in delivery room in good and stable condition.        Labor Event Times    Labor onset date:  10/29/19 Onset time:   6:00 PM   Dilation complete date:  10/30/19 Complete time:   1:05 AM   Start pushing date/time:  10/30/2019 0120      Labor Length    1st Stage (hrs):  7 (min):  5   2nd Stage (hrs):  0 (min):  26   3rd Stage (hrs):  0 (min):  4      Labor Events     labor?:  No   steroids:  None  Labor  "Type:  Spontaneous     Rupture date/time: 10/29/19 2215   Rupture type:  Artificial Rupture of Membranes  Fluid color:  Clear     Delivery/Placenta Date and Time    Delivery Date:  10/30/19 Delivery Time:   1:31 AM   Placenta Date/Time:  10/30/2019  1:35 AM  Oxytocin given at the time of delivery:  after delivery of placenta     Apgars    Living status:  Living   1 Minute 5 Minute 10 Minute 15 Minute 20 Minute   Skin color: 1  2       Heart rate: 2  2       Reflex irritability: 2  2       Muscle tone: 2  2       Respiratory effort: 2  2       Total: 9  10       Apgars assigned by:  GEOVANNA RN     Cord    Vessels:  3 Vessels Complications:  Nuchal   Cord Blood Disposition:  Lab Gases Sent?:  No       Resuscitation    Methods:  None          [place] Measurements    Weight:  6 lb 13 oz Length:  1' 8\"   Head circumference:  33.7 cm Chest circumference:  33.7 cm      Skin to Skin and Feeding Plan    Skin to skin initiation date/time: 1/10/1841    Skin to skin with:  Mother  Skin to skin end date/time:     How do you plan to feed your baby:  Breastfeeding     Labor Events and Shoulder Dystocia    Fetal Tracing Prior to Delivery:  Category 1  Shoulder dystocia present?:  Neg             Delivery (Maternal) (Provider to Complete) ([id number])    Episiotomy:  None  Perineal lacerations:  None    Periurethral laceration:  right Repaired?:  Yes   Labial laceration:  left Repaired?:  Yes      Blood Loss  Mother: Jenny Bradford #[id number]   Start of Mother's Information    IO Blood Loss  10/29/19 1800 - 10/30/19 0211    None           End of Mother's Information  Mother: Jenny Bradford #[id number]         Delivery - Provider to Complete ([id number])    Delivering clinician:  Jose Riddle MD  Delivery Type (Choose the 1 that will go to the Birth History):  Vaginal, Spontaneous          Placenta    Delayed Cord Clamping:  Done  Date/Time:  10/30/2019  1:35 AM  Removal:  Expressed  Disposition:  Hospital " disposal     Anesthesia    Method:  Epidural, Nitrous Oxide, INTRAVENOUS REGIONAL          Presentation and Position    Presentation:  Vertex  Position:  Right Occiput Transverse           Jose Riddle MD

## 2019-10-30 NOTE — ANESTHESIA PREPROCEDURE EVALUATION
Anesthesia Pre-Procedure Evaluation    Patient: Jenny Bradford   MRN: [id number] : 1995          Preoperative Diagnosis: * No pre-op diagnosis entered *    * No procedures listed *    Past Medical History:   Diagnosis Date     Asthma      Ovarian cyst      Past Surgical History:   Procedure Laterality Date     NO HISTORY OF SURGERY         Anesthesia Evaluation       history and physical reviewed .      No history of anesthetic complications          ROS/MED HX    ENT/Pulmonary:     (+)asthma , . .    Neurologic:  - neg neurologic ROS     Cardiovascular:  - neg cardiovascular ROS       METS/Exercise Tolerance:     Hematologic:         Musculoskeletal:         GI/Hepatic:  - neg GI/hepatic ROS       Renal/Genitourinary:         Endo:         Psychiatric:         Infectious Disease:         Malignancy:         Other:                     neg OB ROS            Physical Exam  Normal systems: cardiovascular, pulmonary and dental    Airway   Mallampati: II  TM distance: > 3 FB  Neck ROM: full  Mouth opening: > 3 cm    Dental     Cardiovascular       Pulmonary             Lab Results   Component Value Date    WBC 11.9 (H) 2019    HGB 13.4 10/29/2019    HCT 34.1 (L) 2019     10/29/2019     2019    POTASSIUM 3.8 2019    CHLORIDE 106 2019    CO2 23 2019    BUN 2 (L) 2019    CR 0.48 (L) 2019    GLC 78 2019    MIGUEL 8.6 2019    ALBUMIN 3.1 (L) 2019    PROTTOTAL 6.9 2019    ALT 24 2019    AST 17 2019    ALKPHOS 89 2019    BILITOTAL 0.4 2019    TSH 1.90 2015    HCG Positive (A) 2019       Preop Vitals  BP Readings from Last 3 Encounters:   10/29/19 108/72   10/29/19 110/60   10/21/19 98/60    Pulse Readings from Last 3 Encounters:   10/29/19 86   10/29/19 74   10/21/19 80      Resp Readings from Last 3 Encounters:   19 16   02/10/18 18   16 16    SpO2 Readings from Last 3 Encounters:  "  02/10/18 98%   04/21/15 100%   04/14/15 99%      Temp Readings from Last 1 Encounters:   10/29/19 97.9  F (36.6  C) (Oral)    Ht Readings from Last 1 Encounters:   04/09/19 1.6 m (5' 3\")      Wt Readings from Last 1 Encounters:   10/29/19 59.6 kg (131 lb 8 oz)    Estimated body mass index is 23.29 kg/m  as calculated from the following:    Height as of 4/9/19: 1.6 m (5' 3\").    Weight as of an earlier encounter on 10/29/19: 59.6 kg (131 lb 8 oz).       Anesthesia Plan      History & Physical Review  History and physical reviewed and following examination; no interval change.    ASA Status:  2 .  OB Epidural Asa: 2   NPO Status:  > 6 hours    Plan for Epidural          Postoperative Care      Consents  Anesthetic plan, risks, benefits and alternatives discussed with:  Patient and Patient.  Use of blood products discussed: No .   .                 RAMONA Price CRNA  "

## 2019-10-30 NOTE — PROGRESS NOTES
S-(situation): Miograine    B-(background): Post vaginal delivery, hx of migraines as a child.    A-(assessment): 8/10 pain on top of head, under skull.  2+ reflexes, ) clonus, denies blurred vision.    R-(recommendations): Called Dr Riddle, see migraine protocol orders.

## 2019-10-30 NOTE — PROGRESS NOTES
DEBRA    Doing well. Lots of lower back and pelvic pressure. Regarding hx of chronic pelvic floor pain, dyspareunia has been significantly improved in pregnancy. Regarding mood hx/possible bipolar disorder, recently started seeing a psychologist, receiving counseling and states it is helping. Also now s/p initial consultation with a psychiatrist, f/u scheduled, likely to start meds postpartum. Otherwise, +FM, denies LOF, abnormal discharge, dysuria.     EXAM:  Vitals:    10/29/19 0927   BP: 110/60   BP Location: Right arm   Patient Position: Chair   Cuff Size: Adult Regular   Pulse: 74   Weight: 59.6 kg (131 lb 8 oz)     Gen: Alert, oriented, appropriately interactive, NAD  Resp: good effort without distress   Abdomen: soft, gravid, non tender, non distended, no masses  Cvx /-3, cephalic     FHR: 155  FH: 37     Labs & Imaging:  A+, Ab-, RI, HIV-, HBSA-, Trep-  UCx wnl  Hgb 10.0 -> 11.5  GC/Chlam neg  PAP NIL  GCT 83     Ultrasound (19): 20w5d, Br, PP, DULCE 11.8, 39%tile, normal anatomy  Growth (10/7) 12%tile    ASSESSMENT/PLAN: Jenny Bradford is a 24 year old  at 39w0d by 11w3d Ultrasound who presents for DEBRA.     Pregnancy complicated by:  Late onset of cares  Bipolar disorder, not on meds: has established with psychology, receiving counseling, also psychiatry, plans to start meds postpartum  Possible EtOH exposure 1st tri  Pelvic floor dysfunction/pain     1. pregnancy  New OB labs, Anatomy Ultrasound, New OB exam all WNL  Passed GCT  S/p Tdap  Growth 12%tile on 10/7  GBS-     2. Depression during pregnancy  - has established with psychology, receiving counseling, also psychiatry, plans to start meds postpartum     3. Postpartum plans  - desires Vera (does not tolerate systemic hormones, liked Mirena but ongoing cramping)  - plans to breast feed  - unsure about peds    Jose Riddle MD   10/29/2019 9:23 PM

## 2019-10-31 LAB — HGB BLD-MCNC: 9.9 G/DL (ref 11.7–15.7)

## 2019-10-31 PROCEDURE — 85018 HEMOGLOBIN: CPT | Performed by: OBSTETRICS & GYNECOLOGY

## 2019-10-31 PROCEDURE — 12000000 ZZH R&B MED SURG/OB

## 2019-10-31 PROCEDURE — 36415 COLL VENOUS BLD VENIPUNCTURE: CPT | Performed by: OBSTETRICS & GYNECOLOGY

## 2019-10-31 PROCEDURE — 25000132 ZZH RX MED GY IP 250 OP 250 PS 637: Performed by: OBSTETRICS & GYNECOLOGY

## 2019-10-31 RX ADMIN — ACETAMINOPHEN 650 MG: 325 TABLET, FILM COATED ORAL at 18:14

## 2019-10-31 RX ADMIN — IBUPROFEN 800 MG: 800 TABLET ORAL at 19:45

## 2019-10-31 RX ADMIN — IBUPROFEN 800 MG: 800 TABLET ORAL at 07:37

## 2019-10-31 RX ADMIN — SENNOSIDES AND DOCUSATE SODIUM 1 TABLET: 8.6; 5 TABLET ORAL at 19:45

## 2019-10-31 RX ADMIN — ACETAMINOPHEN 650 MG: 325 TABLET, FILM COATED ORAL at 12:24

## 2019-10-31 RX ADMIN — IBUPROFEN 800 MG: 800 TABLET ORAL at 01:38

## 2019-10-31 RX ADMIN — SENNOSIDES AND DOCUSATE SODIUM 1 TABLET: 8.6; 5 TABLET ORAL at 07:36

## 2019-10-31 RX ADMIN — ACETAMINOPHEN 650 MG: 325 TABLET, FILM COATED ORAL at 07:37

## 2019-10-31 RX ADMIN — IBUPROFEN 800 MG: 800 TABLET ORAL at 13:34

## 2019-10-31 NOTE — DISCHARGE SUMMARY
[facility] Discharge Summary    Jenny Bradford MRN# [id number]   Age: 24 year old YOB: 1995     Date of Admission:  10/29/2019  Date of Discharge::    Admitting Physician:  Jose Riddle MD  Discharge Physician:  Washington Woo MD            Admission Diagnoses:   Encounter for triage in pregnant patient  Normal pregnancy   (spontaneous vaginal delivery)  Active Problems:    Encounter for triage in pregnant patient    Normal pregnancy     (spontaneous vaginal delivery)               Discharge Diagnosis:   Active Problems:    Encounter for triage in pregnant patient    Normal pregnancy     (spontaneous vaginal delivery)               Procedures:   Procedure(s):             Medications Prior to Admission:     No medications prior to admission.             Discharge Medications:     Current Discharge Medication List      CONTINUE these medications which have NOT CHANGED    Details   Prenatal Vit-Fe Fumarate-FA (PRENATAL MULTIVITAMIN W/IRON) 27-0.8 MG tablet Take 1 tablet by mouth daily         STOP taking these medications       Famotidine (PEPCID PO) Comments:   Reason for Stopping:         ferrous gluconate (FERGON) 324 (38 Fe) MG tablet Comments:   Reason for Stopping:         ondansetron (ZOFRAN-ODT) 4 MG ODT tab Comments:   Reason for Stopping:         polyethylene glycol (MIRALAX/GLYCOLAX) powder Comments:   Reason for Stopping:         ranitidine (ZANTAC) 75 MG tablet Comments:   Reason for Stopping:                     Consultations:   No consultations were requested during this admission          Brief History of Labor or Admission:   Jenny Bradford is a 24 year old female who is 39w0d pregnant and being admitted for active cervical change and regular contractions. She was seen this morning by Dr. Riddle, who stripped her membranes x2. At her appointment with Dr. Riddle this morning, she was 2 cm dilated and -3 station. On admission this evening, she  has progressed to a 5 cm and at a 0 station. She reports feeling contractions regularly, approx every 4-5 minutes. She is still feeling baby move and has had no rush of fluids or bloody discharge.            Hospital Course:   The patient's hospital course was unremarkable.  She recovered as anticipated and experienced no post-operative complications.  On discharge, her pain was well controlled. Vaginal bleeding is similar to peak menstrual flow.  Voiding without difficulty.  Ambulating well and tolerating a normal diet.  No fever or significant wound drainage.  Breastfeeding well.  Infant is stable. She was discharged on post-partum day #1.    Prenatal with Fe for anemia    What sounded like a spinal HA has resolved with caffeine and fluids      Post-partum hemoglobin:   Hemoglobin   Date Value Ref Range Status   10/31/2019 9.9 (L) 11.7 - 15.7 g/dL Final             Discharge Instructions and Follow-Up:   Discharge diet: Advance to a regular diet as tolerated   Discharge activity: No heavy lifting, pushing, pulling for 2 week(s)  Pelvic rest: abstain from intercourse and do not use tampons for 6 week(s)   Discharge follow-up: Follow up with primary care provider in 6 weeks   Wound care: Drink plenty of fluids  Ice to area for comfort  Keep wound clean and dry             Discharge Disposition:   Discharged to home          Washington Woo MD

## 2019-10-31 NOTE — PLAN OF CARE
S: Status  B: , Day 1, headache for past 24 hours.  A: Pt complaining of HA 1-/10. Worsens when up walking. Also complains of neck pain. Warm pack to neck helps. Dr. Woo notified of headache. Anesthesia notified, offered patient to do blood patch now or wait until tomorrow morning if HA still persists. Pt will push fluids, increase caffeine intake and wait until tomrrow morning to see if she would like to do a blood patch. Anesthesia will stop in tomorrow morning. Pt breastfeeding independently. VSS.   R: Continue with oral pain meds.

## 2019-11-01 ENCOUNTER — ANESTHESIA (OUTPATIENT)
Dept: OBGYN | Facility: CLINIC | Age: 24
End: 2019-11-01
Payer: MEDICAID

## 2019-11-01 ENCOUNTER — ANESTHESIA EVENT (OUTPATIENT)
Dept: OBGYN | Facility: CLINIC | Age: 24
End: 2019-11-01
Payer: MEDICAID

## 2019-11-01 VITALS
TEMPERATURE: 97.6 F | HEART RATE: 74 BPM | OXYGEN SATURATION: 98 % | SYSTOLIC BLOOD PRESSURE: 98 MMHG | RESPIRATION RATE: 20 BRPM | DIASTOLIC BLOOD PRESSURE: 72 MMHG

## 2019-11-01 PROCEDURE — 25000125 ZZHC RX 250: Performed by: NURSE ANESTHETIST, CERTIFIED REGISTERED

## 2019-11-01 PROCEDURE — 40000671 ZZH STATISTIC ANESTHESIA CASE

## 2019-11-01 PROCEDURE — 37000011 ZZH ANESTHESIA WARD SERVICE: Performed by: NURSE ANESTHETIST, CERTIFIED REGISTERED

## 2019-11-01 PROCEDURE — 25000132 ZZH RX MED GY IP 250 OP 250 PS 637: Performed by: OBSTETRICS & GYNECOLOGY

## 2019-11-01 RX ORDER — LIDOCAINE HYDROCHLORIDE 40 MG/ML
INJECTION, SOLUTION RETROBULBAR PRN
Status: DISCONTINUED | OUTPATIENT
Start: 2019-11-01 | End: 2019-11-01

## 2019-11-01 RX ORDER — LIDOCAINE HYDROCHLORIDE 10 MG/ML
INJECTION, SOLUTION EPIDURAL; INFILTRATION; INTRACAUDAL; PERINEURAL
Status: DISCONTINUED
Start: 2019-11-01 | End: 2019-11-01 | Stop reason: WASHOUT

## 2019-11-01 RX ADMIN — ACETAMINOPHEN 650 MG: 325 TABLET, FILM COATED ORAL at 12:56

## 2019-11-01 RX ADMIN — LIDOCAINE HYDROCHLORIDE 4.5 ML: 40 INJECTION, SOLUTION RETROBULBAR; TOPICAL at 14:00

## 2019-11-01 RX ADMIN — SENNOSIDES AND DOCUSATE SODIUM 2 TABLET: 8.6; 5 TABLET ORAL at 12:56

## 2019-11-01 RX ADMIN — IBUPROFEN 800 MG: 800 TABLET ORAL at 05:09

## 2019-11-01 RX ADMIN — IBUPROFEN 800 MG: 800 TABLET ORAL at 19:23

## 2019-11-01 RX ADMIN — ACETAMINOPHEN 650 MG: 325 TABLET, FILM COATED ORAL at 02:06

## 2019-11-01 RX ADMIN — IBUPROFEN 800 MG: 800 TABLET ORAL at 12:56

## 2019-11-01 RX ADMIN — ACETAMINOPHEN 650 MG: 325 TABLET, FILM COATED ORAL at 18:19

## 2019-11-01 NOTE — PROGRESS NOTES
Anesthesia was up to see the patient this morning.  Pt is stating that her headache is still there but is better then it was yesterday. Continues to hydrate with water and has been drinking coffee.  Anesthesia plans on coming back to see patient again this afternoon and will reassess then to see if the patient needs a blood patch.   Will continue with ibuprofen and tylenol for pain relief.

## 2019-11-01 NOTE — PLAN OF CARE
Breast feeding independently, self and infant cares done independently. Had several cups of coffee with some relief of headache. When she stopped drinking the coffee the headache came back. Has increased fluid intake, plans to have CRNA come in at 0730 to possibly try a blood patch to see if that helps, clustering cares to optimize sleep as well

## 2019-11-01 NOTE — ANESTHESIA CARE TRANSFER NOTE
Patient: Jenny Bradford    * No procedures listed *    Diagnosis: * No pre-op diagnosis entered *  Diagnosis Additional Information: No value filed.    Anesthesia Type:   No value filed.     Note:  Airway :Room Air  Patient transferred to:Labor and Delivery  Handoff Report: Identifed the Patient, Identified the Reponsible Provider, Reviewed the pertinent medical history, Discussed the surgical course, Reviewed Intra-OP anesthesia mangement and issues during anesthesia, Set expectations for post-procedure period and Allowed opportunity for questions and acknowledgement of understanding      Vitals: (Last set prior to Anesthesia Care Transfer)              Electronically Signed By: RAMONA Doan CRNA  November 1, 2019  2:52 PM

## 2019-11-01 NOTE — PROGRESS NOTES
Anesthesia notified that patient is not feeling any better and continues to have a headache.  They will be up to see the patient again for a potential blood patch.

## 2019-11-01 NOTE — ANESTHESIA POSTPROCEDURE EVALUATION
Patient: Jenny Bradford    * No procedures listed *    Diagnosis:* No pre-op diagnosis entered *  Diagnosis Additional Information: No value filed.    Anesthesia Type:  No value filed.    Note:  Anesthesia Post Evaluation    Patient location during evaluation: Bedside  Patient participation: Able to fully participate in evaluation  Level of consciousness: awake  Pain management: adequate  Airway patency: patent  Cardiovascular status: acceptable and hemodynamically stable  Respiratory status: acceptable, room air and nonlabored ventilation  Hydration status: stable  PONV: none     Anesthetic complications: None    Comments: Patient was happy with the anesthesia care received and no anesthesia related complications were noted.  I will follow up with the patient again if it is needed.        Last vitals:  Vitals:    10/31/19 2045 11/01/19 0206 11/01/19 0815   BP: 107/70 108/68 111/70   Pulse: 88 89 70   Resp: 18 18 20   Temp: 98.6  F (37  C) 98.4  F (36.9  C) 97  F (36.1  C)   SpO2: 98% 99%          Electronically Signed By: RAMONA Doan CRNA  November 1, 2019  2:52 PM

## 2019-11-01 NOTE — ANESTHESIA PROCEDURE NOTES
Peripheral nerve/Neuraxial procedure note : Other  Pre-Procedure    Location:     Pre-Anesthestic Checklist: patient identified, IV checked, risks and benefits discussed, informed consent, monitors and equipment checked, pre-op evaluation, at physician/surgeon's request and post-op pain management    Timeout  Correct Patient: Yes   Correct Procedure: Yes   Correct Site: Yes   Correct Laterality: N/A   Correct Position: Yes   Site Marked: Yes   .   Procedure Documentation    Diagnosis:DURAL PUNCTURE HEADACHE.    Procedure: Other, bilateral.   Patient Position:supine     .  .        Assessment/Narrative  .  .  . Comments:  Placed q-tip in sinus, slow dripped 4% lidocaine over 15 minutes.  Patient tolerated procedure well.

## 2019-11-02 NOTE — ANESTHESIA PREPROCEDURE EVALUATION
"Anesthesia Pre-Procedure Evaluation    Patient: Jenny Bradford   MRN: [id number] : 1995          Preoperative Diagnosis: * No pre-op diagnosis entered *    * No procedures listed *    Past Medical History:   Diagnosis Date     Asthma      Ovarian cyst      Past Surgical History:   Procedure Laterality Date     NO HISTORY OF SURGERY                          Lab Results   Component Value Date    WBC 11.9 (H) 2019    HGB 9.9 (L) 10/31/2019    HCT 34.1 (L) 2019     10/29/2019     2019    POTASSIUM 3.8 2019    CHLORIDE 106 2019    CO2 23 2019    BUN 2 (L) 2019    CR 0.48 (L) 2019    GLC 78 2019    MIGUEL 8.6 2019    ALBUMIN 3.1 (L) 2019    PROTTOTAL 6.9 2019    ALT 24 2019    AST 17 2019    ALKPHOS 89 2019    BILITOTAL 0.4 2019    TSH 1.90 2015    HCG Positive (A) 2019       Preop Vitals  BP Readings from Last 3 Encounters:   19 98/72   10/29/19 110/60   10/21/19 98/60    Pulse Readings from Last 3 Encounters:   19 74   10/29/19 74   10/21/19 80      Resp Readings from Last 3 Encounters:   19 20   19 16   02/10/18 18    SpO2 Readings from Last 3 Encounters:   19 98%   02/10/18 98%   04/21/15 100%      Temp Readings from Last 1 Encounters:   19 97.6  F (36.4  C) (Oral)    Ht Readings from Last 1 Encounters:   19 1.6 m (5' 3\")      Wt Readings from Last 1 Encounters:   10/29/19 59.6 kg (131 lb 8 oz)    Estimated body mass index is 23.29 kg/m  as calculated from the following:    Height as of 19: 1.6 m (5' 3\").    Weight as of an earlier encounter on 10/29/19: 59.6 kg (131 lb 8 oz).       Anesthesia Plan  Procedure only, no anesthetic delivered    History & Physical Review      ASA Status:  2 .    NPO Status:  > 2 hours    Plan for Other (blood patch)     No contraindications to the EBP.  Previous sphenopalatine block was not successful to keep the " headache away.      Postoperative Care      Consents  Anesthetic plan, risks, benefits and alternatives discussed with:  Patient.  Blood products discussed: yes, injecting her own blood.   .                 RAMONA Rogers CRNA

## 2019-11-02 NOTE — PROGRESS NOTES
S: Discharge  to home    B: Patient had a Vaginal delivery with no complications. Baby girl Baby's name Francis, breast: . Support person's name Zacarias.      A: Pt independent with self and  cares.  Pt had a blood patch done by anesthesia for a spinal headache.  Breastfeeding is going well.  Pt's milk had come in prior to discharge.  Reviewed with patient her discharge instructions as well as safe sleeping for newborns. History of depression, reviewed with her signs of postpartum depression. Will also do a public health referral for this.     R:   Discharge instructions reviewed and questions answered.  Belongings gathered and returned to the patient. Agreed to follow up in 6 weeks or sooner with any question or concerns.     Nursing Discharge Checklist:    Pneumovax screened and given, if appropriate: NA  Influenza vaccine screened and given, if appropriate: YES. Pt refused  Staples removed (): N/A  Breast milk returned: N/A  Hydrogel pads sent home:N/A  Birth Certificate Done: YES. ROP done

## 2019-11-02 NOTE — ANESTHESIA POSTPROCEDURE EVALUATION
Patient: Jenny Bradford    * No procedures listed *    Diagnosis:* No pre-op diagnosis entered *  Diagnosis Additional Information: No value filed.    Anesthesia Type:  No value filed.    Note:  Anesthesia Post Evaluation    Patient location during evaluation: bedside  Patient participation: Able to fully participate in evaluation  Level of consciousness: awake and alert  Pain management: satisfactory to patient  Airway patency: patent  Cardiovascular status: acceptable  Respiratory status: acceptable and room air  Hydration status: acceptable  PONV: none     Anesthetic complications: None    Comments: Patient has no headache at this time and has a slight backache from the EBP.  No issues at this time.  Advised her to take it easy tonight and tomorrow and avoid heavy lifting or bearing down for a couple days.  I will follow up with the pt if needed.        Last vitals:  Vitals:    11/01/19 0206 11/01/19 0815 11/01/19 1500   BP: 108/68 111/70 98/72   Pulse: 89 70 74   Resp: 18 20 20   Temp: 98.4  F (36.9  C) 97  F (36.1  C) 97.6  F (36.4  C)   SpO2: 99%  98%         Electronically Signed By: RAMONA Rogers CRNA  November 1, 2019  8:08 PM

## 2019-11-05 NOTE — PROGRESS NOTES
Jenny Bradford  Gender: female  : 1995  [address] [place] [address]  [phone] (home)   Medical Record: [id number]  Primary Care Provider: Clinic, [facility]   ?   Discharge Phone Call: Key Words/Key Times     How are you and the baby?     How are feedings going?     Voiding & Stooling?     Any questions or concerns?     Follow-up appointment?       We want to provide excellent care here at [facility]. Do you have any feedback for us that would help us improve?     Call back COMMENTS:         Attempted Calls:   __19 1301 discharge callback attempted no answer, L/M per   FIONA Quispe RN_______    19 1239 second call back attempt no answer FIONA Quispe RN   __________

## 2019-12-28 ASSESSMENT — PATIENT HEALTH QUESTIONNAIRE - PHQ9: SUM OF ALL RESPONSES TO PHQ QUESTIONS 1-9: 2

## 2020-01-03 ENCOUNTER — PRENATAL OFFICE VISIT (OUTPATIENT)
Dept: OBGYN | Facility: OTHER | Age: 25
End: 2020-01-03
Payer: MEDICAID

## 2020-01-03 VITALS
BODY MASS INDEX: 18.39 KG/M2 | SYSTOLIC BLOOD PRESSURE: 94 MMHG | DIASTOLIC BLOOD PRESSURE: 66 MMHG | WEIGHT: 103.8 LBS | HEART RATE: 72 BPM

## 2020-01-03 DIAGNOSIS — Z30.430 ENCOUNTER FOR INSERTION OF INTRAUTERINE CONTRACEPTIVE DEVICE: ICD-10-CM

## 2020-01-03 DIAGNOSIS — Z11.3 SCREEN FOR STD (SEXUALLY TRANSMITTED DISEASE): ICD-10-CM

## 2020-01-03 DIAGNOSIS — Z32.00 PREGNANCY EXAMINATION OR TEST, PREGNANCY UNCONFIRMED: ICD-10-CM

## 2020-01-03 LAB — HCG UR QL: NEGATIVE

## 2020-01-03 PROCEDURE — 99207 ZZC POST PARTUM EXAM: CPT | Performed by: OBSTETRICS & GYNECOLOGY

## 2020-01-03 PROCEDURE — 87591 N.GONORRHOEAE DNA AMP PROB: CPT | Performed by: OBSTETRICS & GYNECOLOGY

## 2020-01-03 PROCEDURE — 58300 INSERT INTRAUTERINE DEVICE: CPT | Performed by: OBSTETRICS & GYNECOLOGY

## 2020-01-03 PROCEDURE — 87491 CHLMYD TRACH DNA AMP PROBE: CPT | Performed by: OBSTETRICS & GYNECOLOGY

## 2020-01-03 PROCEDURE — 81025 URINE PREGNANCY TEST: CPT | Performed by: OBSTETRICS & GYNECOLOGY

## 2020-01-03 RX ORDER — FERROUS GLUCONATE 324(38)MG
324 TABLET ORAL
Refills: 3 | COMMUNITY
Start: 2019-11-20

## 2020-01-03 ASSESSMENT — PATIENT HEALTH QUESTIONNAIRE - PHQ9
SUM OF ALL RESPONSES TO PHQ QUESTIONS 1-9: 0
10. IF YOU CHECKED OFF ANY PROBLEMS, HOW DIFFICULT HAVE THESE PROBLEMS MADE IT FOR YOU TO DO YOUR WORK, TAKE CARE OF THINGS AT HOME, OR GET ALONG WITH OTHER PEOPLE: NOT DIFFICULT AT ALL
SUM OF ALL RESPONSES TO PHQ QUESTIONS 1-9: 0

## 2020-01-03 NOTE — NURSING NOTE
"Chief Complaint   Patient presents with     Postpartum Care       Initial BP 94/66 (BP Location: Right arm, Patient Position: Chair, Cuff Size: Adult Regular)   Pulse 72   Wt 47.1 kg (103 lb 12.8 oz)   LMP 2019 (Approximate)   BMI 18.39 kg/m   Estimated body mass index is 18.39 kg/m  as calculated from the following:    Height as of 19: 1.6 m (5' 3\").    Weight as of this encounter: 47.1 kg (103 lb 12.8 oz).  BP completed using cuff size: regular        The following HM Due: NONE      The following patient reported/Care Every where data was sent to:  P ABSTRACT QUALITY INITIATIVES [[id number]]     PHQ-9 score:    PHQ-9 SCORE 1/3/2020   PHQ-9 Total Score -   PHQ-9 Total Score MyChart 0   PHQ-9 Total Score 0       Natalie Perez CMA  January 3, 2020           "

## 2020-01-03 NOTE — PROGRESS NOTES
Answers for HPI/ROS submitted by the patient on 1/3/2020   If you checked off any problems, how difficult have these problems made it for you to do your work, take care of things at home, or get along with other people?: Not difficult at all    Conflicting answers have been found for some questions. Please document the patient's answers manually.     Kyleena IUD Insertion:  CONSULT:    Is a pregnancy test required: Yes.  Was it positive or negative?  Negative  Was a consent obtained?  Yes    Subjective: Jenny Bradford is a 24 year old  presents for a postpartum visit and desires Kyleena type IUD.    Jenny Bradford understands she may have the IUD removed at any time. IUD should be removed by a health care provider.    The entire insertion procedure was reviewed with the patient, including care after placement.    Patient's last menstrual period was 2019 (approximate).  No allergy to betadine or shellfish.   HCG Qual Urine   Date Value Ref Range Status   2020 Negative NEG^Negative Final     Comment:     This test is for screening purposes.  Results should be interpreted along with   the clinical picture.  Confirmation testing is available if warranted by   ordering [id number], HCG Quantitative Pregnancy.         BP 94/66 (BP Location: Right arm, Patient Position: Chair, Cuff Size: Adult Regular)   Pulse 72   Wt 47.1 kg (103 lb 12.8 oz)   LMP 2019 (Approximate)   BMI 18.39 kg/m      Pelvic Exam:   EG/BUS: normal genital architecture without lesions, erythema or abnormal secretions.   Vagina: moist, pink, rugae with physiologic discharge and secretions  Cervix: multiparous no lesions and pink, moist, closed, without lesion or CMT  Uterus: anteverted position, mobile, no pain  Adnexa: within normal limits and no masses, nodularity, tenderness    PROCEDURE NOTE: -- IUD Insertion    Reason for Insertion: contraception    Under sterile technique, cervix was visualized with speculum and  prepped with Betadine solution swab x 3. Tenaculum was placed for stability. The uterus was gently straightened and sounded to 6.5 cm. IUD prepared for placement, and IUD inserted according to 's instructions without difficulty or significant resitance, and deployed at the fundus. The strings were visualized and trimmed to 2.5 cm from the external os. Tenaculum was removed and hemostasis noted. Speculum removed.  Patient tolerated procedure well.    Lot # [id number]  Exp: JAN 2021    EBL: minimal    Complications: none    ASSESSMENT:     ICD-10-CM    1. Pregnancy examination or test, pregnancy unconfirmed Z32.00 HCG Qual, Urine ([id number])   2. Screen for STD (sexually transmitted disease) Z11.3 Chlamydia trachomatis PCR     Neisseria gonorrhoeae PCR        PLAN:    Given 's handouts, including when to have IUD removed, list of danger s/sx, side effects and follow up recommended. Encouraged condom use for prevention of STD. Back up contraception advised for 7 days if progestin method. Advised to call for any fever, for prolonged or severe pain or bleeding, abnormal vaginal discharge, or unable to palpate strings. She was advised to use pain medications (ibuprofen) as needed for mild to moderate pain. Advised to follow-up in clinic in 4-6 weeks for IUD string check if unable to find strings or as directed by provider.     Jose Riddle MD  January 3, 2020 12:29 PM

## 2020-01-03 NOTE — PROGRESS NOTES
Answers for HPI/ROS submitted by the patient on 1/3/2020   If you checked off any problems, how difficult have these problems made it for you to do your work, take care of things at home, or get along with other people?: Not difficult at all    Clinic Note    CC:    Chief Complaint   Patient presents with     Postpartum Care      HPI:  Jenny Bradford is a 24 year old  woman with history of  on 10/30 who presents for a postpartum check. Overall doing very well.  Breast feeding going well, some occasional discomfort though no focal symptoms.   Her labia laceration healed well, per report, though some mild tugging in the repair area with tight jeans.   She denies any vaginal symptoms. She has also started having sex again, and no longer having dyspareunia that was present prior to pregnancy.   She continues to have counseling for possible bipolar disorder, though mood has been overall stable, has not needed to start medications.      PMH:   Past Medical History:   Diagnosis Date     Asthma      Ovarian cyst      SurgHx:   Past Surgical History:   Procedure Laterality Date     NO HISTORY OF SURGERY       FamHx:   Family History   Problem Relation Age of Onset     Thyroid Disease Mother      Asthma Mother      Ovarian cysts Mother      No Known Problems Father      No Known Problems Maternal Grandmother      Hypertension Maternal Grandfather      No Known Problems Paternal Grandmother      No Known Problems Paternal Grandfather      Asthma Half-Sister      Asthma Half-Sister      Asthma Half-Brother      Asthma Half-Brother      Asthma Half-Brother      SocHx:   Social History     Socioeconomic History     Marital status: Single     Spouse name: None     Number of children: None     Years of education: None     Highest education level: None   Occupational History     None   Social Needs     Financial resource strain: None     Food insecurity:     Worry: None     Inability: None     Transportation needs:      "Medical: None     Non-medical: None   Tobacco Use     Smoking status: Former Smoker     Packs/day: 0.10     Types: Cigarettes     Smokeless tobacco: Never Used   Substance and Sexual Activity     Alcohol use: Not Currently     Comment: rare     Drug use: Not Currently     Comment: occasionally marijuana  \"once or twice a day\"     Sexual activity: Yes     Partners: Male   Lifestyle     Physical activity:     Days per week: None     Minutes per session: None     Stress: None   Relationships     Social connections:     Talks on phone: None     Gets together: None     Attends Sabianism service: None     Active member of club or organization: None     Attends meetings of clubs or organizations: None     Relationship status: None     Intimate partner violence:     Fear of current or ex partner: None     Emotionally abused: None     Physically abused: None     Forced sexual activity: None   Other Topics Concern     Parent/sibling w/ CABG, MI or angioplasty before 65F 55M? No   Social History Narrative    4/2019  Lives in [place] with [place] with Zacarias's family.  Jenny is quitting smoking and Zacarias smokes e-cig.  Others in the home smoke outside.  They have indoor cats.  Advised about toxoplasmosis precautions.  No concerns about domestic violence.  She is a /.     Allergies:   Amitriptyline-perphenazine [perphenazine-amitriptyline]  Current Medications:   Current Outpatient Medications   Medication Sig Dispense Refill     ferrous gluconate (FERGON) 324 (38 Fe) MG tablet Take 324 mg by mouth daily (with breakfast)  3     levonorgestrel (KYLEENA) 19.5 MG IUD 1 each (19.5 mg) by Intrauterine route once       Prenatal Vit-Fe Fumarate-FA (PRENATAL MULTIVITAMIN W/IRON) 27-0.8 MG tablet Take 1 tablet by mouth daily       ROS: 10 point ROS negative other than as noted in the HPI    EXAM:  Vitals:    01/03/20 0955   BP: 94/66   BP Location: Right arm   Patient Position: Chair   Cuff Size: Adult Regular "   Pulse: 72   Weight: 47.1 kg (103 lb 12.8 oz)    Body mass index is 18.39 kg/m .    Gen: Alert, oriented, appropriately interactive, NAD  CV: RRR  Resp: CTAB, good effort without distress   Breasts: mildly engorged bilaterally, no dominant masses, no skin changes, no nipple discharge, nontender; bra is much too big  Abdomen: soft, non tender, non distended, no masses, no hernias. No inguinal lymphadenopathy.   Perineum: no lesions; normal appearing external genitalia, bartholins glands, urethra, skenes glands  Right labia minora laceration well healed  Vagina: no masses or lesions or discharge, normally rugated.  Cervix: no masses or lesions or discharge   Bimanual exam:   Nontender pelvic floor muscles  Uterus: midline, anteverted, small, mobile  no masses, non-tender  Adnexa: no masses or tenderness appreciated   No cervical motion tenderness  MSK: normal gait, symmetric movements UE & LE  Lower extremities: non-tender, no edema    Labs & Imaging:  Results for orders placed or performed in visit on 20 (from the past 24 hour(s))   HCG Qual, Urine ([id number])   Result Value Ref Range    HCG Qual Urine Negative NEG^Negative     Lab Results   Component Value Date    PAP NIL 2019    PAP NIL 2015     ASSESSMENT/PLAN: Jenny Bradford is a 24 year old  woman with history of  on 10/30 who presents for a postpartum check.     1. Routine postpartum follow-up  - Doing well  - Encouraged to continue therapy  - Recommend tighter fitting bra    2. Encounter for insertion of intrauterine contraceptive device  - levonorgestrel (KYLEENA) 19.5 MG IUD; 1 each (19.5 mg) by Intrauterine route once  - levonorgestrel (KYLEENA) 19.5 MG IUD 19.5 mg  - INSERTION INTRAUTERINE DEVICE    3. Screen for STD (sexually transmitted disease)  - Chlamydia trachomatis PCR  - Neisseria gonorrhoeae PCR    4. Pregnancy examination or test, pregnancy unconfirmed  - HCG Qual, Urine ([id number])    Jose Riddle MD    1/3/2020 12:30 PM

## 2020-01-04 ASSESSMENT — PATIENT HEALTH QUESTIONNAIRE - PHQ9: SUM OF ALL RESPONSES TO PHQ QUESTIONS 1-9: 0

## 2020-01-06 ENCOUNTER — OFFICE VISIT (OUTPATIENT)
Dept: OBGYN | Facility: OTHER | Age: 25
End: 2020-01-06
Payer: MEDICAID

## 2020-01-06 VITALS
BODY MASS INDEX: 18.17 KG/M2 | SYSTOLIC BLOOD PRESSURE: 96 MMHG | WEIGHT: 102.6 LBS | DIASTOLIC BLOOD PRESSURE: 60 MMHG | HEART RATE: 86 BPM

## 2020-01-06 DIAGNOSIS — Z30.431 IUD CHECK UP: Primary | ICD-10-CM

## 2020-01-06 PROCEDURE — 99212 OFFICE O/P EST SF 10 MIN: CPT | Performed by: OBSTETRICS & GYNECOLOGY

## 2020-01-06 NOTE — NURSING NOTE
"Chief Complaint   Patient presents with     Follow Up     IUD check       Initial BP 96/60 (BP Location: Right arm, Patient Position: Chair, Cuff Size: Adult Regular)   Pulse 86   Wt 46.5 kg (102 lb 9.6 oz)   LMP 2019 (Approximate)   BMI 18.17 kg/m   Estimated body mass index is 18.17 kg/m  as calculated from the following:    Height as of 19: 1.6 m (5' 3\").    Weight as of this encounter: 46.5 kg (102 lb 9.6 oz).  BP completed using cuff size: regular        The following HM Due: NONE      The following patient reported/Care Every where data was sent to:  P ABSTRACT QUALITY INITIATIVES [[id number]]       Natalie Perez, [facility]  2020           "

## 2020-01-06 NOTE — PROGRESS NOTES
Kyleena IUD placed on 1/3/20. Patient with sharp, sudden onset, deep right pelvic pain and fullness this AM, moved, felt a pressure release, then symptoms spontaneously resolved. Otherwise feeling fine. Presents to have IUD checked. POC TVUS performed, IUD lower in endometrium, though above the cervix. Uterus is small and anteflexed. Exam is non tender. Plan to watch for now, will reassess IUD position when returns for string check. To return sooner with any recurrence of symptoms.   Jose Riddle MD  OB/GYN  January 6, 2020, 2:30 PM

## 2020-03-15 ENCOUNTER — HEALTH MAINTENANCE LETTER (OUTPATIENT)
Age: 25
End: 2020-03-15

## 2020-03-31 ENCOUNTER — MEDICAL CORRESPONDENCE (OUTPATIENT)
Dept: HEALTH INFORMATION MANAGEMENT | Facility: CLINIC | Age: 25
End: 2020-03-31

## 2021-01-09 ENCOUNTER — HEALTH MAINTENANCE LETTER (OUTPATIENT)
Age: 26
End: 2021-01-09

## 2021-02-22 ENCOUNTER — TELEPHONE (OUTPATIENT)
Dept: FAMILY MEDICINE | Facility: OTHER | Age: 26
End: 2021-02-22

## 2021-02-22 NOTE — TELEPHONE ENCOUNTER
Patient sent MyChart asking to reschedule appointment.  She is informed to call specialty scheduling to reschedule.  Closing this encounter.  JONNA SorensenN, RN

## 2021-02-23 ENCOUNTER — OFFICE VISIT (OUTPATIENT)
Dept: OBGYN | Facility: CLINIC | Age: 26
End: 2021-02-23
Payer: COMMERCIAL

## 2021-02-23 VITALS
HEART RATE: 76 BPM | OXYGEN SATURATION: 98 % | BODY MASS INDEX: 18.48 KG/M2 | TEMPERATURE: 98 F | SYSTOLIC BLOOD PRESSURE: 104 MMHG | WEIGHT: 104.3 LBS | DIASTOLIC BLOOD PRESSURE: 60 MMHG

## 2021-02-23 DIAGNOSIS — Z30.431 IUD CHECK UP: Primary | ICD-10-CM

## 2021-02-23 PROCEDURE — 99212 OFFICE O/P EST SF 10 MIN: CPT | Performed by: OBSTETRICS & GYNECOLOGY

## 2021-02-24 NOTE — PROGRESS NOTES
Clinic Note    CC:    Chief Complaint   Patient presents with     IUD     check      HPI:  Jenny Bradford is a 25 year old  who presents for an IUD check. Kyleena placed 1/3/20 given did not tolerate Mirena in the past, very small anteflexed uterus and had dyspareunia. Kyleena has been asymptomatic, no dyspareunia, normal monthly cycles. However, missed her past two cycles with just some spotting, partner states is no longer aware of the IUD, though she did not clarify what this means. She also has had some recent intermittent nausea, though taken home UPTs that have all been negative. She is otherwise well. No other concerns.     EXAM:  Vitals:    21 1357   BP: 104/60   BP Location: Right arm   Patient Position: Chair   Cuff Size: Adult Regular   Pulse: 76   Temp: 98  F (36.7  C)   TempSrc: Temporal   SpO2: 98%   Weight: 47.3 kg (104 lb 4.8 oz)    Body mass index is 18.48 kg/m .    Gen: Alert, oriented, appropriately interactive, NAD  Resp: good effort without distress   Abdomen: soft, non tender, non distended, no masses, no hernias. No inguinal lymphadenopathy.   Perineum: no lesions; normal appearing external genitalia, bartholins glands, urethra, skenes glands  Vagina: no masses or lesions or discharge, normally rugated.  Cervix: no masses or lesions or discharge   Anus: appears normal  Bimanual exam:   Nontender pelvic floor muscles  Uterus: midline, anteverted, anteflexed, very small, mobile  no masses, non-tender  Adnexa: no masses or tenderness appreciated   No cervical motion tenderness  MSK: normal gait, symmetric movements UE & LE  Lower extremities: non-tender, no edema    Labs & Imaging:  POC TVUS: normal appearing small anteflexed uterus, IUD positioned slightly low mid endometrial cavity, thin EMS, non tender exam    Lab Results   Component Value Date    PAP NIL 2019    PAP NIL 2015       ASSESSMENT/PLAN: Jenny Bradford is a 25 year old  who presents for an IUD  check, exam and TVUS reassuring, finding reviewed with patient, reassurance given.     Jose Riddle MD   2/23/2021 7:28 PM

## 2021-03-13 ENCOUNTER — HEALTH MAINTENANCE LETTER (OUTPATIENT)
Age: 26
End: 2021-03-13

## 2021-10-23 ENCOUNTER — HEALTH MAINTENANCE LETTER (OUTPATIENT)
Age: 26
End: 2021-10-23

## 2022-04-09 ENCOUNTER — HEALTH MAINTENANCE LETTER (OUTPATIENT)
Age: 27
End: 2022-04-09

## 2022-10-09 ENCOUNTER — HEALTH MAINTENANCE LETTER (OUTPATIENT)
Age: 27
End: 2022-10-09

## 2023-05-21 ENCOUNTER — HEALTH MAINTENANCE LETTER (OUTPATIENT)
Age: 28
End: 2023-05-21

## 2024-09-20 NOTE — PROGRESS NOTES
IV fluids running & IV Zofran given.  Pt is feeling better asking for something to eat.  Pt will be brought some toast & milk.     LORENZAM regarding ANDREA apt.  Prior clt of Dr. Ortiz